# Patient Record
Sex: MALE | Race: BLACK OR AFRICAN AMERICAN | Employment: UNEMPLOYED | URBAN - METROPOLITAN AREA
[De-identification: names, ages, dates, MRNs, and addresses within clinical notes are randomized per-mention and may not be internally consistent; named-entity substitution may affect disease eponyms.]

---

## 2018-11-30 ENCOUNTER — HOSPITAL ENCOUNTER (OUTPATIENT)
Age: 58
Setting detail: OBSERVATION
Discharge: HOME | End: 2018-12-02
Attending: EMERGENCY MEDICINE | Admitting: INTERNAL MEDICINE
Payer: MEDICARE

## 2018-11-30 DIAGNOSIS — K92.1 MELENA: Primary | ICD-10-CM

## 2018-11-30 DIAGNOSIS — K92.2 LOWER GI BLEED: ICD-10-CM

## 2018-11-30 LAB
ALBUMIN SERPL-MCNC: 3.9 G/DL (ref 3.5–5)
ALBUMIN/GLOB SERPL: 1 {RATIO} (ref 1.2–3.5)
ALP SERPL-CCNC: 151 U/L (ref 50–136)
ALT SERPL-CCNC: 42 U/L (ref 12–65)
ANION GAP SERPL CALC-SCNC: 6 MMOL/L (ref 7–16)
AST SERPL-CCNC: 32 U/L (ref 15–37)
BASOPHILS # BLD: 0.1 K/UL (ref 0–0.2)
BASOPHILS NFR BLD: 1 % (ref 0–2)
BILIRUB SERPL-MCNC: 0.3 MG/DL (ref 0.2–1.1)
BUN SERPL-MCNC: 22 MG/DL (ref 6–23)
CALCIUM SERPL-MCNC: 9 MG/DL (ref 8.3–10.4)
CHLORIDE SERPL-SCNC: 106 MMOL/L (ref 98–107)
CO2 SERPL-SCNC: 27 MMOL/L (ref 21–32)
CREAT SERPL-MCNC: 1.45 MG/DL (ref 0.8–1.5)
DIFFERENTIAL METHOD BLD: ABNORMAL
EOSINOPHIL # BLD: 0.3 K/UL (ref 0–0.8)
EOSINOPHIL NFR BLD: 4 % (ref 0.5–7.8)
ERYTHROCYTE [DISTWIDTH] IN BLOOD BY AUTOMATED COUNT: 13.2 % (ref 11.9–14.6)
GLOBULIN SER CALC-MCNC: 4 G/DL (ref 2.3–3.5)
GLUCOSE SERPL-MCNC: 137 MG/DL (ref 65–100)
HCT VFR BLD AUTO: 39.9 % (ref 41.1–50.3)
HGB BLD-MCNC: 13.2 G/DL (ref 13.6–17.2)
IMM GRANULOCYTES # BLD: 0 K/UL (ref 0–0.5)
IMM GRANULOCYTES NFR BLD AUTO: 0 % (ref 0–5)
LYMPHOCYTES # BLD: 3.2 K/UL (ref 0.5–4.6)
LYMPHOCYTES NFR BLD: 43 % (ref 13–44)
MCH RBC QN AUTO: 31.4 PG (ref 26.1–32.9)
MCHC RBC AUTO-ENTMCNC: 33.1 G/DL (ref 31.4–35)
MCV RBC AUTO: 95 FL (ref 79.6–97.8)
MONOCYTES # BLD: 0.6 K/UL (ref 0.1–1.3)
MONOCYTES NFR BLD: 8 % (ref 4–12)
NEUTS SEG # BLD: 3.4 K/UL (ref 1.7–8.2)
NEUTS SEG NFR BLD: 45 % (ref 43–78)
NRBC # BLD: 0 K/UL (ref 0–0.2)
PLATELET # BLD AUTO: 197 K/UL (ref 150–450)
PMV BLD AUTO: 11 FL (ref 9.4–12.3)
POTASSIUM SERPL-SCNC: 4.2 MMOL/L (ref 3.5–5.1)
PROT SERPL-MCNC: 7.9 G/DL (ref 6.3–8.2)
RBC # BLD AUTO: 4.2 M/UL (ref 4.23–5.6)
SODIUM SERPL-SCNC: 139 MMOL/L (ref 136–145)
WBC # BLD AUTO: 7.5 K/UL (ref 4.3–11.1)

## 2018-11-30 PROCEDURE — 85025 COMPLETE CBC W/AUTO DIFF WBC: CPT

## 2018-11-30 PROCEDURE — 99284 EMERGENCY DEPT VISIT MOD MDM: CPT | Performed by: EMERGENCY MEDICINE

## 2018-11-30 PROCEDURE — 80053 COMPREHEN METABOLIC PANEL: CPT

## 2018-11-30 PROCEDURE — 81003 URINALYSIS AUTO W/O SCOPE: CPT | Performed by: EMERGENCY MEDICINE

## 2018-11-30 NOTE — ED TRIAGE NOTES
Pt reports past 3 day days he has been having blood in his stool that have progressed from bright red to almost black stools. Pt also reports lower abd pain, pt is on xarelto as well

## 2018-12-01 PROBLEM — K62.5 RECTAL BLEED: Status: ACTIVE | Noted: 2018-12-01

## 2018-12-01 LAB — GLUCOSE BLD STRIP.AUTO-MCNC: 96 MG/DL (ref 65–100)

## 2018-12-01 PROCEDURE — 74011250637 HC RX REV CODE- 250/637: Performed by: INTERNAL MEDICINE

## 2018-12-01 PROCEDURE — 99218 HC RM OBSERVATION: CPT

## 2018-12-01 PROCEDURE — 96360 HYDRATION IV INFUSION INIT: CPT

## 2018-12-01 PROCEDURE — 74011250636 HC RX REV CODE- 250/636: Performed by: INTERNAL MEDICINE

## 2018-12-01 PROCEDURE — 82962 GLUCOSE BLOOD TEST: CPT

## 2018-12-01 PROCEDURE — 96361 HYDRATE IV INFUSION ADD-ON: CPT

## 2018-12-01 RX ORDER — METOPROLOL TARTRATE 25 MG/1
12.5 TABLET, FILM COATED ORAL 2 TIMES DAILY
Status: DISCONTINUED | OUTPATIENT
Start: 2018-12-01 | End: 2018-12-02 | Stop reason: HOSPADM

## 2018-12-01 RX ORDER — SODIUM CHLORIDE 0.9 % (FLUSH) 0.9 %
5-10 SYRINGE (ML) INJECTION EVERY 8 HOURS
Status: DISCONTINUED | OUTPATIENT
Start: 2018-12-01 | End: 2018-12-02 | Stop reason: HOSPADM

## 2018-12-01 RX ORDER — LISINOPRIL 5 MG/1
10 TABLET ORAL 2 TIMES DAILY
Status: DISCONTINUED | OUTPATIENT
Start: 2018-12-01 | End: 2018-12-02 | Stop reason: HOSPADM

## 2018-12-01 RX ORDER — SODIUM CHLORIDE 9 MG/ML
100 INJECTION, SOLUTION INTRAVENOUS CONTINUOUS
Status: DISCONTINUED | OUTPATIENT
Start: 2018-12-01 | End: 2018-12-02 | Stop reason: HOSPADM

## 2018-12-01 RX ORDER — ACETAMINOPHEN 325 MG/1
650 TABLET ORAL
Status: DISCONTINUED | OUTPATIENT
Start: 2018-12-01 | End: 2018-12-02 | Stop reason: HOSPADM

## 2018-12-01 RX ORDER — ONDANSETRON 2 MG/ML
4 INJECTION INTRAMUSCULAR; INTRAVENOUS
Status: DISCONTINUED | OUTPATIENT
Start: 2018-12-01 | End: 2018-12-02 | Stop reason: HOSPADM

## 2018-12-01 RX ORDER — SODIUM CHLORIDE 0.9 % (FLUSH) 0.9 %
5-10 SYRINGE (ML) INJECTION AS NEEDED
Status: DISCONTINUED | OUTPATIENT
Start: 2018-12-01 | End: 2018-12-02 | Stop reason: HOSPADM

## 2018-12-01 RX ORDER — HYDROCORTISONE 25 MG/G
CREAM TOPICAL 4 TIMES DAILY
Status: DISCONTINUED | OUTPATIENT
Start: 2018-12-01 | End: 2018-12-02

## 2018-12-01 RX ORDER — OXYCODONE AND ACETAMINOPHEN 10; 325 MG/1; MG/1
1 TABLET ORAL
Status: DISCONTINUED | OUTPATIENT
Start: 2018-12-01 | End: 2018-12-02 | Stop reason: HOSPADM

## 2018-12-01 RX ADMIN — Medication 10 ML: at 05:33

## 2018-12-01 RX ADMIN — LISINOPRIL 10 MG: 5 TABLET ORAL at 09:37

## 2018-12-01 RX ADMIN — Medication 10 ML: at 15:28

## 2018-12-01 RX ADMIN — Medication 10 ML: at 21:58

## 2018-12-01 RX ADMIN — SODIUM CHLORIDE 100 ML/HR: 900 INJECTION, SOLUTION INTRAVENOUS at 03:00

## 2018-12-01 RX ADMIN — LISINOPRIL 10 MG: 5 TABLET ORAL at 17:51

## 2018-12-01 RX ADMIN — SODIUM CHLORIDE 100 ML/HR: 900 INJECTION, SOLUTION INTRAVENOUS at 17:53

## 2018-12-01 RX ADMIN — METOPROLOL TARTRATE 12.5 MG: 25 TABLET, FILM COATED ORAL at 09:37

## 2018-12-01 RX ADMIN — METOPROLOL TARTRATE 12.5 MG: 25 TABLET, FILM COATED ORAL at 18:00

## 2018-12-01 NOTE — PROGRESS NOTES
Hospitalist Progress Note   
2018 Admit Date: 2018 11:30 PM  
NAME: Danish Garcia III  
:  1960 MRN:  [id number] Attending: Laura Lee MD 
PCP:  Kyrie Green, Not On File SUBJECTIVE:  
Mr. Ramona John is a 64 yo male with a past medical history of colon cancer (, underwent surgery and subsequently had 6 months of chemotherapy, which he 
completed in 2013), history of DVT on chronic AC with Xarelto, essential hypertension, gout.  
  
Pt presented to the ER with symptoms BRBPR and dark stool for the past 2 days. Reports the amount of blood was significant. Also reports some vague periumbilical abd pain. No fever, chills, CP, N/V or urinary symptoms.  
  
In the ER he was afebrile, hemodynamically stable and saturating well on RA. FOBT +. Hb was 13. Interval History (): patient examined at bedside. No acute overnight events. Patient has not had a bowel movement since admission and states that he has not observed any additional bleeding. No fevers/chills, abdominal pain, chest pain, shortness of breath, nausea/vomiting, or BRBPR/melena. Review of Systems negative with exception of pertinent positives noted above PHYSICAL EXAM  
 
Visit Vitals /72 Pulse 74 Temp 98.2 °F (36.8 °C) Resp 16 Ht 5' 10\" (1.778 m) Wt 118.8 kg (262 lb) SpO2 99% BMI 37.59 kg/m² Temp (24hrs), Av.8 °F (36.6 °C), Min:97.4 °F (36.3 °C), Max:98.2 °F (36.8 °C) Oxygen Therapy O2 Sat (%): 99 % (18 1440) Pulse via Oximetry: 84 beats per minute (18 0205) O2 Device: Room air (18 0123) No intake or output data in the 24 hours ending 18 1609 General: No acute distress   
Lungs:  CTA Bilaterally. Heart:  Regular rate and rhythm,  No murmur, rub, or gallop Abdomen: Soft, Non distended, Non tender, Positive bowel sounds Extremities: No cyanosis, clubbing or edema Neurologic:  No focal deficits ASSESSMENT Active Hospital Problems Diagnosis Date Noted  Rectal bleed 12/01/2018  DVT of upper extremity (deep vein thrombosis) ([facility] [address] 07/09/2013  
  left  Colon cancer ([facility] [address] 07/09/2013 S/p surgery and chemo Plan: # Acute GI bleed, likely due to hemorrhoids rather than UGIB 
- GI consultation, appreciate recs 
- no immediate plans for endoscopy at this time, will start clear liquid diet and make NPO after midnight 
- added hydrocortisone rectal cream  
- trend Hgb/Hct 
- transfuse if Hgb<7 
- holding Xarelto for now, will restart with symptomatic improvement # Chronic DVT of UE 
- holding Xarelto for now, as above # History of CRC 
- follows at the [address] for screening F/E/N: maintenance fluids, replete electrolytes as needed, clear liquid diet and NPO after MN Ppx: SCDs for VTE Code Status: FULL CODE Disposition: pending workup as above, patient will be discharged home following hospitalization with no immediate post-discharge needs Signed By: Pérez Blake DO   
 December 1, 2018

## 2018-12-01 NOTE — ED NOTES
TRANSFER - OUT REPORT: 
 
Verbal report given to SHANT Vega(name) on Dede Mcgrath III  being transferred to 2(unit) for routine progression of care Report consisted of patients Situation, Background, Assessment and  
Recommendations(SBAR). Information from the following report(s) ED Summary and Recent Results was reviewed with the receiving nurse. Lines:  
Peripheral IV 12/01/18 Anterior;Right Hand (Active) Opportunity for questions and clarification was provided. Patient transported with: 
 [employer]

## 2018-12-01 NOTE — PROGRESS NOTES
TRANSFER - IN REPORT: 
 
Verbal report received from Clara RN(name) on Brooksie Sheerer III  being received from ER(unit) for routine progression of care Report consisted of patients Situation, Background, Assessment and  
Recommendations(SBAR). Information from the following report(s) SBAR and ED Summary was reviewed with the receiving nurse. Opportunity for questions and clarification was provided. Assessment completed upon patients arrival to unit and care assumed.

## 2018-12-01 NOTE — PROGRESS NOTES
Hourly rounds performed this shift, pt sitting in bed watching TV. No bloody stools noted this shift. Pt refused Hemorrhoid cream. No other needs at this time, pt instructed on NPO status after MN.

## 2018-12-01 NOTE — ED PROVIDER NOTES
Patient is a 81-year-old male Who is coming in with melena. He states that he's had this since yesterday and has had about 4 episodes of dark stools. There was originally some blood mixed in. He does take xarelto for blood clots in the past.  He states that he does have a little bit of abdominal pain associated with it. He denies any vomiting. No syncope or other associated symptoms. Past Medical History:  
Diagnosis Date  Cancer [facility])   
 colon cancer  Gastrointestinal disorder GERD  Hypertension  Ill-defined condition   
 blood clot neck Past Surgical History:  
Procedure Laterality Date  ABDOMEN SURGERY PROC UNLISTED    
 colon ca  HX ORTHOPAEDIC    
 back] No family history on file. Social History Socioeconomic History  Marital status: LEGALLY  Spouse name: Not on file  Number of children: Not on file  Years of education: Not on file  Highest education level: Not on file Social Needs  Financial resource strain: Not on file  Food insecurity - worry: Not on file  Food insecurity - inability: Not on file  Transportation needs - medical: Not on file  Transportation needs - non-medical: Not on file Occupational History  Not on file Tobacco Use  Smoking status: Never Smoker Substance and Sexual Activity  Alcohol use: No  
 Drug use: No  
 Sexual activity: Not on file Other Topics Concern  Not on file Social History Narrative  Not on file ALLERGIES: Patient has no known allergies. Review of Systems Constitutional: Negative for chills and fever. Respiratory: Negative for chest tightness, shortness of breath, wheezing and stridor. Cardiovascular: Negative. Negative for chest pain and palpitations. Gastrointestinal: Negative for abdominal pain, diarrhea, nausea and vomiting. Musculoskeletal: Negative for neck pain and neck stiffness. Skin: Negative. All other systems reviewed and are negative. Vitals:  
 11/30/18 1901 BP: 149/71 Pulse: 97 Resp: 18 Temp: 98.1 °F (36.7 °C) SpO2: 97% Weight: 118.8 kg (262 lb) Height: 5' 10\" (1.778 m) Physical Exam  
Constitutional: He appears well-developed and well-nourished. No distress. HENT:  
Head: Normocephalic and atraumatic. Mouth/Throat: No oropharyngeal exudate. Eyes: Conjunctivae and EOM are normal. Pupils are equal, round, and reactive to light. No scleral icterus. Neck: Normal range of motion. Neck supple. No tracheal deviation present. No thyromegaly present. Pulmonary/Chest: Effort normal and breath sounds normal. No stridor. No respiratory distress. He has no wheezes. He has no rales. He exhibits no tenderness. Abdominal: Soft. He exhibits no distension and no mass. There is no tenderness. There is no guarding. Genitourinary: Rectal exam shows guaiac positive stool. Neurological: He is alert. Skin: Skin is warm and dry. He is not diaphoretic. Psychiatric: He has a normal mood and affect. His behavior is normal.  
Nursing note and vitals reviewed. MDM Number of Diagnoses or Management Options Lower GI bleed:  
Melena:  
Diagnosis management comments: Patient has a stable hemoglobin currently. He does have Hemoccult positive stools and is on xarelto. Given this I have spoken to the hospitalist for evaluation. Alexsandra Morales MD; 11/30/2018 @11:48 PM Voice dictation software was used during the making of this note. This software is not perfect and grammatical and other typographical errors may be present. This note has not been proofread for errors. 
=================================================================== Amount and/or Complexity of Data Reviewed Clinical lab tests: ordered and reviewed (Results for orders placed or performed during the hospital encounter of 11/30/18 
-CBC WITH AUTOMATED DIFF 
 Result                      Value             Ref Range WBC                         7.5               4.3 - 11.1 K* 
     RBC                         4.20 (L)          4.23 - 5.6 M* HGB                         13.2 (L)          13.6 - 17.2 * HCT                         39.9 (L)          41.1 - 50.3 % MCV                         95.0              79.6 - 97.8 * MCH                         31.4              26.1 - 32.9 * MCHC                        33.1              31.4 - 35.0 * RDW                         13.2              11.9 - 14.6 % PLATELET                    197               150 - 450 K/* MPV                         11.0              9.4 - 12.3 FL ABSOLUTE NRBC               0.00              0.0 - 0.2 K/* DF                          AUTOMATED NEUTROPHILS                 45                43 - 78 % LYMPHOCYTES                 43                13 - 44 % MONOCYTES                   8                 4.0 - 12.0 % EOSINOPHILS                 4                 0.5 - 7.8 % BASOPHILS                   1                 0.0 - 2.0 % IMMATURE GRANULOCYTES       0                 0.0 - 5.0 %   
     ABS. NEUTROPHILS            3.4               1.7 - 8.2 K/* ABS. LYMPHOCYTES            3.2               0.5 - 4.6 K/* ABS. MONOCYTES              0.6               0.1 - 1.3 K/* ABS. EOSINOPHILS            0.3               0.0 - 0.8 K/* ABS. BASOPHILS              0.1               0.0 - 0.2 K/* ABS. IMM. GRANS.            0.0               0.0 - 0.5 K/* 
-METABOLIC PANEL, COMPREHENSIVE Result                      Value             Ref Range Sodium                      139               136 - 145 mm* Potassium                   4.2               3.5 - 5.1 mm*      Chloride                    106               98 - 107 mmo* 
 CO2                         27                21 - 32 mmol* Anion gap                   6 (L)             7 - 16 mmol/L Glucose                     137 (H)           65 - 100 mg/* BUN                         22                6 - 23 MG/DL Creatinine                  1.45              0.8 - 1.5 MG* 
     GFR est AA                  >60               >60 ml/min/1* GFR est non-AA              53 (L)            >60 ml/min/1* Calcium                     9.0               8.3 - 10.4 M* Bilirubin, total            0.3               0.2 - 1.1 MG* ALT (SGPT)                  42                12 - 65 U/L   
     AST (SGOT)                  32                15 - 37 U/L Alk. phosphatase            151 (H)           50 - 136 U/L Protein, total              7.9               6.3 - 8.2 g/* Albumin                     3.9               3.5 - 5.0 g/* Globulin                    4.0 (H)           2.3 - 3.5 g/* A-G Ratio                   1.0 (L)           1.2 - 3.5     ) Procedures

## 2018-12-01 NOTE — H&P
Hospitalist H&P Note Admit Date:  2018 11:30 PM  
Name:  Paul Mcknight Age:  63 y.o. 
:  1960 MRN:  [id number] PCP:  Sandra, Not On File Treatment Team: Attending Provider: Edilia Mckeon MD; Primary Nurse: Bhaskar Correa 
 
HPI:  
Mr. Inessa Camp is a 64 yo male with a past medical history of colon cancer (, underwent surgery and subsequently had 6 months of chemotherapy, which he 
completed in 2013), history of DVT on chronic AC with Xarelto, essential hypertension, gout. Pt presented to the ER with symptoms BRBPR and dark stool for the past 2 days. Reports the amount of blood was significant. Also reports some vague periumbilical abd pain. No fever, chills, CP, N/V or urinary symptoms. In the ER he was afebrile, hemodynamically stable and saturating well on RA. FOBT +. Hb was 13.  
 
10 systems reviewed and negative except as noted in HPI. Past Medical History:  
Diagnosis Date  Cancer [facility])   
 colon cancer  Gastrointestinal disorder GERD  Hypertension  Ill-defined condition   
 blood clot neck Past Surgical History:  
Procedure Laterality Date  ABDOMEN SURGERY PROC UNLISTED    
 colon ca  HX ORTHOPAEDIC    
 back] No Known Allergies Social History Tobacco Use  Smoking status: Never Smoker Substance Use Topics  Alcohol use: No  
  
No family history on file. There is no immunization history on file for this patient. PTA Medications: 
Prior to Admission Medications Prescriptions Last Dose Informant Patient Reported? Taking?  
diazepam (VALIUM) 5 mg tablet   No No  
Sig: Take 1 Tab by mouth every eight (8) hours as needed (Muscle spasm). Max Daily Amount: 15 mg.  
docusate calcium (SURFAK) 240 mg capsule   Yes No  
Sig: Take 240 mg by mouth two (2) times a day. lisinopril (PRINIVIL, ZESTRIL) 10 mg tablet   No No  
Sig: Take 1 Tab by mouth two (2) times a day.   
metoprolol (LOPRESSOR) 25 mg tablet   No No  
 Sig: Take 0.5 Tabs by mouth two (2) times a day. omeprazole (PRILOSEC) 20 mg capsule   Yes No  
Sig: Take 20 mg by mouth daily. oxyCODONE-acetaminophen (PERCOCET 10)  mg per tablet   No No  
Sig: Take 1 Tab by mouth every six (6) hours as needed. oxyCODONE-acetaminophen (PERCOCET) 5-325 mg per tablet   No No  
Sig: Take 1 Tab by mouth every four (4) hours as needed for Pain. Max Daily Amount: 6 Tabs. predniSONE (STERAPRED DS) 10 mg dose pack   No No  
Sig: Please take as directed on package. Please clarify any questions with the Pharmacist.  
rivaroxaban (XARELTO) 10 mg tablet   Yes Yes Sig: Take 10 mg by mouth daily. tramadol (ULTRAM) 50 mg tablet   No No  
Sig: Take 1 tablet by mouth every six (6) hours as needed for Pain. Facility-Administered Medications: None Objective:  
 
Patient Vitals for the past 24 hrs: 
 Temp Pulse Resp BP SpO2  
12/01/18 0205  84  133/76 99 % 11/30/18 1901 98.1 °F (36.7 °C) 97 18 149/71 97 % Oxygen Therapy O2 Sat (%): 99 % (12/01/18 0205) Pulse via Oximetry: 84 beats per minute (12/01/18 0205) O2 Device: Room air (11/30/18 1901) No intake or output data in the 24 hours ending 12/01/18 0207 Physical Exam: 
General:    Well nourished. Alert. Eyes:   Normal sclera. Extraocular movements intact. ENT:  Normocephalic, atraumatic. Moist mucous membranes CV:   RRR. No murmur, rub, or gallop. Lungs:  CTAB. No wheezing, rhonchi, or rales. Abdomen: Soft, nontender, nondistended. Bowel sounds normal.  
Extremities: Warm and dry. No cyanosis or edema. Neurologic: CN II-XII grossly intact. Sensation intact. Skin:     No rashes or jaundice. Psych:  Normal mood and affect. I reviewed the labs, imaging, EKGs, telemetry, and other studies done this admission. Data Review:  
Recent Results (from the past 24 hour(s)) CBC WITH AUTOMATED DIFF Collection Time: 11/30/18  7:10 PM  
Result Value Ref Range WBC 7.5 4.3 - 11.1 K/uL RBC 4.20 (L) 4.23 - 5.6 M/uL  
 HGB 13.2 (L) 13.6 - 17.2 g/dL HCT 39.9 (L) 41.1 - 50.3 % MCV 95.0 79.6 - 97.8 FL  
 MCH 31.4 26.1 - 32.9 PG  
 MCHC 33.1 31.4 - 35.0 g/dL  
 RDW 13.2 11.9 - 14.6 % PLATELET 180 176 - 189 K/uL MPV 11.0 9.4 - 12.3 FL ABSOLUTE NRBC 0.00 0.0 - 0.2 K/uL  
 DF AUTOMATED NEUTROPHILS 45 43 - 78 % LYMPHOCYTES 43 13 - 44 % MONOCYTES 8 4.0 - 12.0 % EOSINOPHILS 4 0.5 - 7.8 % BASOPHILS 1 0.0 - 2.0 % IMMATURE GRANULOCYTES 0 0.0 - 5.0 %  
 ABS. NEUTROPHILS 3.4 1.7 - 8.2 K/UL  
 ABS. LYMPHOCYTES 3.2 0.5 - 4.6 K/UL  
 ABS. MONOCYTES 0.6 0.1 - 1.3 K/UL  
 ABS. EOSINOPHILS 0.3 0.0 - 0.8 K/UL  
 ABS. BASOPHILS 0.1 0.0 - 0.2 K/UL  
 ABS. IMM. GRANS. 0.0 0.0 - 0.5 K/UL METABOLIC PANEL, COMPREHENSIVE Collection Time: 11/30/18  7:10 PM  
Result Value Ref Range Sodium 139 136 - 145 mmol/L Potassium 4.2 3.5 - 5.1 mmol/L Chloride 106 98 - 107 mmol/L  
 CO2 27 21 - 32 mmol/L Anion gap 6 (L) 7 - 16 mmol/L Glucose 137 (H) 65 - 100 mg/dL BUN 22 6 - 23 MG/DL Creatinine 1.45 0.8 - 1.5 MG/DL  
 GFR est AA >60 >60 ml/min/1.73m2 GFR est non-AA 53 (L) >60 ml/min/1.73m2 Calcium 9.0 8.3 - 10.4 MG/DL Bilirubin, total 0.3 0.2 - 1.1 MG/DL  
 ALT (SGPT) 42 12 - 65 U/L  
 AST (SGOT) 32 15 - 37 U/L Alk. phosphatase 151 (H) 50 - 136 U/L Protein, total 7.9 6.3 - 8.2 g/dL Albumin 3.9 3.5 - 5.0 g/dL Globulin 4.0 (H) 2.3 - 3.5 g/dL A-G Ratio 1.0 (L) 1.2 - 3.5 All Micro Results None Other Studies: No results found. Assessment and Plan:  
 
Hospital Problems as of 12/1/2018 Never Reviewed Codes Class Noted - Resolved POA Rectal bleed ICD-10-CM: K62.5 ICD-9-CM: 569.3  12/1/2018 - Present Unknown BRBPR, melena: for 2 days. Pt is on Xarelto and has history of colon cancer s/p surgery and chemo in 2012. Hb on admission 13.2. Will monitor closely and consult GI service. Hx of UE extensive DVT: Had a port in place back then, for which he underwent tpa for in 2013. Will hold Xarelto for now. Essential hypertension: Cont home Lisinopril and Lopressor. Monitor BP levels and adjust medications as needed. Gout: Reports being at home on Allopurinol but cannot recall the dose. Family to bring his medications from home in am.  
 
 
DVT ppx:  SCDs Code status:  Full Estimated LOS:  Greater than 2 midnights Risk:  high Signed:  
Jono Richard MD

## 2018-12-01 NOTE — CONSULTS
[facility] Consult Note       [facility] Phys[facility]: [facility]    Referring Provider:  Raegan Shin Date:  12/1/2018    Admit Date:  11/30/2018    Chief Complaint:  Rectal bleeding    Subjective:     History of Present Illness:  Patient is a 63 y.o. male with PMH of below, who is seen in consultation at the request of Dr. Frank Dergoot for rectal bleeding. His history is notable for CRC in about 2012 with surgery and chemotherapy. He has had yearly colonoscopy through the [place] since that time, last in 2017. He does not think he has had a polyp since his CRC. He has a history of minor rectal bleeding with BMs intermittently over the last few years. He does not know the cause. In the last few days, he has had increased amounts of red blood with clots and dark stool. He has also had a \"soreness\" throughout his entire abdomen which is new for him. He has no upper GI symptoms. He has no dizziness, CP, or SOB. His care is complicated by Xarelto for prior clot. PMH:  Past Medical History:   Diagnosis Date    Cancer [facility])     colon cancer    Gastrointestinal disorder     GERD    Hypertension     Ill-defined condition     blood clot neck       PSH:  Past Surgical History:   Procedure Laterality Date    ABDOMEN SURGERY PROC UNLISTED      colon ca    HX ORTHOPAEDIC      back]       Allergies:  No Known Allergies    Home Medications:  Prior to Admission medications    Medication Sig Start Date End Date Taking? Authorizing Provider   rivaroxaban (XARELTO) 10 mg tablet Take 10 mg by mouth daily. Yes Other, MD Mague   predniSONE (STERAPRED DS) 10 mg dose pack Please take as directed on package. Please clarify any questions with the Pharmacist. 1/23/16   Sherlyn Olivo MD   diazepam (VALIUM) 5 mg tablet Take 1 Tab by mouth every eight (8) hours as needed (Muscle spasm).  Max Daily Amount: 15 mg. 1/23/16   Sherlyn Olivo MD   oxyCODONE-acetaminophen (PERCOCET) 5-325 mg per tablet Take 1 Tab by mouth every four (4) hours as needed for Pain. Max Daily Amount: 6 Tabs. 1/23/16   So Randolph MD   tramadol (ULTRAM) 50 mg tablet Take 1 tablet by mouth every six (6) hours as needed for Pain. 8/25/14   Haris Brewer MD   oxyCODONE-acetaminophen (PERCOCET 10)  mg per tablet Take 1 Tab by mouth every six (6) hours as needed. 10/21/13   Anusha Gallegos MD   docusate calcium (SURFAK) 240 mg capsule Take 240 mg by mouth two (2) times a day. Mague Nolasco MD   lisinopril (PRINIVIL, ZESTRIL) 10 mg tablet Take 1 Tab by mouth two (2) times a day. 7/11/13   Scarlet Farris MD   metoprolol (LOPRESSOR) 25 mg tablet Take 0.5 Tabs by mouth two (2) times a day. 7/11/13   Scarlet Farris MD   omeprazole (PRILOSEC) 20 mg capsule Take 20 mg by mouth daily. Mague Nolasco MD       Hospital Medications:  Current Facility-Administered Medications   Medication Dose Route Frequency    lisinopril (PRINIVIL, ZESTRIL) tablet 10 mg  10 mg Oral BID    metoprolol tartrate (LOPRESSOR) tablet 12.5 mg  12.5 mg Oral BID    sodium chloride (NS) flush 5-10 mL  5-10 mL IntraVENous Q8H    sodium chloride (NS) flush 5-10 mL  5-10 mL IntraVENous PRN    0.9% sodium chloride infusion  100 mL/hr IntraVENous CONTINUOUS    acetaminophen (TYLENOL) tablet 650 mg  650 mg Oral Q4H PRN    oxyCODONE-acetaminophen (PERCOCET 10)  mg per tablet 1 Tab  1 Tab Oral Q4H PRN    ondansetron (ZOFRAN) injection 4 mg  4 mg IntraVENous Q4H PRN       Social History:  Social History     Tobacco Use    Smoking status: Never Smoker   Substance Use Topics    Alcohol use: No       Pt denies any history of drug use, blood transfusions, or tattoos. Family History:  No family history on file. Review of Systems:  A detailed 10 system ROS is obtained, with pertinent positives as listed above. All others are negative.     Diet:      Objective:     Physical Exam:  Vitals:  Visit Vitals  /78   Pulse 80   Temp 97.9 °F (36.6 °C)   Resp 17   Ht 5' 10\" (1.778 m)   Wt 118.8 kg (262 lb)   SpO2 100%   BMI 37.59 kg/m²     Gen:  Pt is alert, cooperative, no acute distress  Skin:  Extremities and face reveal no rashes. HEENT: Sclerae anicteric. Extra-occular muscles are intact. No oral ulcers. No abnormal pigmentation of the lips. The neck is supple. Cardiovascular: Regular rate and rhythm. No murmurs, gallops, or rubs. Respiratory:  Comfortable breathing with no accessory muscle use. Clear breath sounds anteriorly with no wheezes, rales, or rhonchi. GI:  Laparotomy scar. Abdomen nondistended, soft, and nontender. Normal active bowel sounds. No enlargement of the liver or spleen. No masses palpable. Rectal:  Deferred  Musculoskeletal:  No pitting edema of the lower legs. Neurological:  Gross memory appears intact. Patient is alert and oriented. Psychiatric:  Mood appears appropriate with judgement intact. Lymphatic:  No cervical or supraclavicular adenopathy. Laboratory:    Recent Labs     11/30/18  1910   WBC 7.5   HGB 13.2*   HCT 39.9*      MCV 95.0      K 4.2      CO2 27   BUN 22   CREA 1.45   CA 9.0   *   *   SGOT 32   ALT 42   TBILI 0.3   ALB 3.9   TP 7.9          Assessment:     Active Problems:    Rectal bleed (12/1/2018)  He has reported significant bleeding, but his hemoglobin is essentially normal after 2 days. I am not convinced there is any clinically significant GI bleeding. The lack of significant elevation of BUN also argues against any upper GI bleeding. I suspect hemorrhoidal bleeding. The new soreness could indicate ischemic colitis. I do not suspect a neoplasm considering his yearly colonoscopy. Plan:     I recommend conservative management. Will allow diet, provide Anusol suppository, observe for changes.     Lona Florentino MD

## 2018-12-01 NOTE — PROGRESS NOTES
12/01/18 0320 Dual Skin Pressure Injury Assessment Dual Skin Pressure Injury Assessment WDL Second Care Provider (Based on [address] [facility]) [address] Skin Integumentary Skin Integumentary (WDL) WDL Wound Prevention and Protection Methods Orientation of Wound Prevention Posterior Location of Wound Prevention Sacrum/Coccyx; Other (comment) Dressing Present  No  
Wound Offloading (Prevention Methods) Bed, pressure reduction mattress Musculoskeletal  
Musculoskeletal (WDL) WDL Primary Nurse Tessie Arredondo and SHANT Calabrese performed a dual skin assessment on this patient {[facility] SKIN ASSESMENT:2076 Jony score is 22

## 2018-12-01 NOTE — PROGRESS NOTES
Pt slept throughout the night with no complains. Hourly round completed throughout the shift. Bed in lower position and call light/ personal items within reach. Will continue to monitor and give bed side shift report to on coming day shift nurse.

## 2018-12-02 VITALS
RESPIRATION RATE: 18 BRPM | HEIGHT: 70 IN | DIASTOLIC BLOOD PRESSURE: 72 MMHG | OXYGEN SATURATION: 99 % | BODY MASS INDEX: 37.51 KG/M2 | SYSTOLIC BLOOD PRESSURE: 128 MMHG | HEART RATE: 68 BPM | WEIGHT: 262 LBS | TEMPERATURE: 98.2 F

## 2018-12-02 LAB
ANION GAP SERPL CALC-SCNC: 6 MMOL/L (ref 7–16)
BUN SERPL-MCNC: 12 MG/DL (ref 6–23)
CALCIUM SERPL-MCNC: 8.5 MG/DL (ref 8.3–10.4)
CHLORIDE SERPL-SCNC: 108 MMOL/L (ref 98–107)
CO2 SERPL-SCNC: 28 MMOL/L (ref 21–32)
CREAT SERPL-MCNC: 0.96 MG/DL (ref 0.8–1.5)
ERYTHROCYTE [DISTWIDTH] IN BLOOD BY AUTOMATED COUNT: 13 % (ref 11.9–14.6)
GLUCOSE SERPL-MCNC: 96 MG/DL (ref 65–100)
HCT VFR BLD AUTO: 39.9 % (ref 41.1–50.3)
HGB BLD-MCNC: 13.2 G/DL (ref 13.6–17.2)
MCH RBC QN AUTO: 31.1 PG (ref 26.1–32.9)
MCHC RBC AUTO-ENTMCNC: 33.1 G/DL (ref 31.4–35)
MCV RBC AUTO: 93.9 FL (ref 79.6–97.8)
NRBC # BLD: 0 K/UL (ref 0–0.2)
PLATELET # BLD AUTO: 184 K/UL (ref 150–450)
PMV BLD AUTO: 10.7 FL (ref 9.4–12.3)
POTASSIUM SERPL-SCNC: 4.1 MMOL/L (ref 3.5–5.1)
RBC # BLD AUTO: 4.25 M/UL (ref 4.23–5.6)
SODIUM SERPL-SCNC: 142 MMOL/L (ref 136–145)
WBC # BLD AUTO: 5.6 K/UL (ref 4.3–11.1)

## 2018-12-02 PROCEDURE — 74011250637 HC RX REV CODE- 250/637: Performed by: INTERNAL MEDICINE

## 2018-12-02 PROCEDURE — 74011250636 HC RX REV CODE- 250/636: Performed by: INTERNAL MEDICINE

## 2018-12-02 PROCEDURE — 99218 HC RM OBSERVATION: CPT

## 2018-12-02 PROCEDURE — 36415 COLL VENOUS BLD VENIPUNCTURE: CPT

## 2018-12-02 PROCEDURE — 85027 COMPLETE CBC AUTOMATED: CPT

## 2018-12-02 PROCEDURE — 80048 BASIC METABOLIC PNL TOTAL CA: CPT

## 2018-12-02 RX ORDER — HYDROCORTISONE ACETATE 25 MG/1
25 SUPPOSITORY RECTAL EVERY 12 HOURS
Status: DISCONTINUED | OUTPATIENT
Start: 2018-12-02 | End: 2018-12-02 | Stop reason: HOSPADM

## 2018-12-02 RX ORDER — HYDROCORTISONE ACETATE 25 MG/1
25 SUPPOSITORY RECTAL EVERY 12 HOURS
Qty: 60 SUPPOSITORY | Refills: 1 | Status: SHIPPED | OUTPATIENT
Start: 2018-12-02 | End: 2018-12-26

## 2018-12-02 RX ADMIN — SODIUM CHLORIDE 100 ML/HR: 900 INJECTION, SOLUTION INTRAVENOUS at 04:31

## 2018-12-02 RX ADMIN — HYDROCORTISONE ACETATE 25 MG: 25 SUPPOSITORY RECTAL at 10:06

## 2018-12-02 RX ADMIN — METOPROLOL TARTRATE 12.5 MG: 25 TABLET, FILM COATED ORAL at 10:01

## 2018-12-02 RX ADMIN — Medication 10 ML: at 05:02

## 2018-12-02 RX ADMIN — LISINOPRIL 10 MG: 5 TABLET ORAL at 10:01

## 2018-12-02 NOTE — DISCHARGE SUMMARY
Hospitalist Discharge Summary     Patient ID:  Magdaleno Edwards  [id number]  63 y.o.  1960  Admit date: 11/30/2018 11:30 PM  Discharge date and time: 12/2/2018  Attending: Vicky Pruett MD  PCP:  Terry Elam, Not On File  Treatment Team: Attending Provider: Vicky Pruett MD; Utilization Review: Pio Rosales RN    Principal Diagnosis <principal problem not specified>   Active Problems:    DVT of upper extremity (deep vein thrombosis) ([facility] [address] (7/9/2013)      Overview: left      Colon cancer ([facility] [address] (7/9/2013)      Overview: S/p surgery and chemo      Rectal bleed (12/1/2018)     Mr. Kevin Mcleod is a 62 yo male with a past medical history of colon cancer (2012, underwent surgery and subsequently had 6 months of chemotherapy, which he  completed in 05/2013), history of DVT on chronic AC with Xarelto, essential hypertension, gout.      Pt presented to the ER with symptoms BRBPR and dark stool for the past 2 days. Reports the amount of blood was significant. Also reports some vague periumbilical abd pain. No fever, chills, CP, N/V or urinary symptoms.      In the ER he was afebrile, hemodynamically stable and saturating well on RA. FOBT +. Hb was 13.        Interval History (12/2): patient examined at bedside. No acute events overnight, no more observed rectal bleeding (last episode was yesterday afternoon) or melena. Hgb stable and patient denies fevers/chills, shortness of breath, chest pain, abdominal pain, dizziness, fatigue, feeling like passing out. Hospital Course:  Please refer to the admission H&P for details of presentation. In summary, the patient is admitted for acute GI bleed. GI consulted with recommendations for hydrocortisone suppositories as bleeding is likely due to internal hemorrhoids rather than UGIB or other internal GI bleed. No planned inpatient interventions.  Patient with significant chronic history of blood clots, told patient that his Xarelto will need to be continued to prevent complications. Patient did not require any blood transfusions while hospitalized. Above explained to patient who understands and agrees with hospital discharge. Told to return to ED if he experiences any new or unexplained symptoms, or if he rectal bleeding worsens. Patient understands and agrees, all questions answered. [facility]2 [facility]. Significant Diagnostic Studies:       Labs: Results:       Chemistry Recent Labs     12/02/18 0543 11/30/18 1910   GLU 96 137*    139   K 4.1 4.2   * 106   CO2 28 27   BUN 12 22   CREA 0.96 1.45   CA 8.5 9.0   AGAP 6* 6*   AP  --  151*   TP  --  7.9   ALB  --  3.9   GLOB  --  4.0*   AGRAT  --  1.0*      CBC w/Diff Recent Labs     12/02/18 0543 11/30/18 1910   WBC 5.6 7.5   RBC 4.25 4.20*   HGB 13.2* 13.2*   HCT 39.9* 39.9*    197   GRANS  --  45   LYMPH  --  43   EOS  --  4      Cardiac Enzymes No results for input(s): CPK, CKND1, ANAMIKA in the last 72 hours. No lab exists for component: CKRMB, TROIP   Coagulation No results for input(s): PTP, INR, APTT in the last 72 hours. No lab exists for component: INREXT    Lipid Panel No results found for: CHOL, CHOLPOCT, CHOLX, CHLST, CHOLV, 653181, HDL, LDL, LDLC, DLDLP, 921675, VLDLC, VLDL, TGLX, TRIGL, TRIGP, TGLPOCT, CHHD, CHHDX   BNP No results for input(s): BNPP in the last 72 hours. Liver Enzymes Recent Labs     11/30/18 1910   TP 7.9   ALB 3.9   *   SGOT 32      Thyroid Studies No results found for: T4, T3U, TSH, TSHEXT         Discharge Exam:  Visit Vitals  /75 (BP 1 Location: Left arm, BP Patient Position: At rest)   Pulse 64   Temp 98 °F (36.7 °C)   Resp 17   Ht 5' 10\" (1.778 m)   Wt 118.8 kg (262 lb)   SpO2 97%   BMI 37.59 kg/m²     General:          No acute distress    Lungs:             CTA Bilaterally.    Heart:              Regular rate and rhythm,  No murmur, rub, or gallop  Abdomen:        Soft, Non distended, Non tender, Positive bowel sounds  Extremities:     No cyanosis, clubbing or edema  Neurologic:      No focal deficits    Disposition: home  Discharge Condition: stable  Patient Instructions:   Current Discharge Medication List      START taking these medications    Details   hydrocortisone (ANUSOL-HC) 25 mg supp Insert 1 Suppository into rectum every twelve (12) hours for 30 days. Qty: 60 Suppository, Refills: 1         CONTINUE these medications which have NOT CHANGED    Details   rivaroxaban (XARELTO) 10 mg tablet Take 10 mg by mouth daily. predniSONE (STERAPRED DS) 10 mg dose pack Please take as directed on package. Please clarify any questions with the Pharmacist.  Qty: 21 Tab, Refills: 0      diazepam (VALIUM) 5 mg tablet Take 1 Tab by mouth every eight (8) hours as needed (Muscle spasm). Max Daily Amount: 15 mg.  Qty: 20 Tab, Refills: 0      oxyCODONE-acetaminophen (PERCOCET) 5-325 mg per tablet Take 1 Tab by mouth every four (4) hours as needed for Pain. Max Daily Amount: 6 Tabs. Qty: 20 Tab, Refills: 0      tramadol (ULTRAM) 50 mg tablet Take 1 tablet by mouth every six (6) hours as needed for Pain. Qty: 20 tablet, Refills: 0      oxyCODONE-acetaminophen (PERCOCET 10)  mg per tablet Take 1 Tab by mouth every six (6) hours as needed. Qty: 20 Tab, Refills: 0      docusate calcium (SURFAK) 240 mg capsule Take 240 mg by mouth two (2) times a day. lisinopril (PRINIVIL, ZESTRIL) 10 mg tablet Take 1 Tab by mouth two (2) times a day. Qty: 60 Tab, Refills: 0      metoprolol (LOPRESSOR) 25 mg tablet Take 0.5 Tabs by mouth two (2) times a day. Qty: 1 Tab, Refills: 0      omeprazole (PRILOSEC) 20 mg capsule Take 20 mg by mouth daily. Activity: Activity as tolerated  Diet: Regular Diet  Wound Care: As directed    Follow-up  ·   [facility] within 1-2 weeks. Time spent to discharge patient 35 minutes  Signed:   Lily Beauchamp DO  12/2/2018  10:44 AM

## 2018-12-02 NOTE — PROGRESS NOTES
Hourly rounds done. Pt denies pain, nausea, vomiting. Pt stated 12/1 afternoon BM w/ bright red blood. No further BM during the night. NPO at midnight. All needs met at this time.

## 2018-12-02 NOTE — DISCHARGE INSTRUCTIONS
Gastrointestinal Bleeding: Care Instructions  Your Care Instructions    The digestive or gastrointestinal tract goes from the mouth to the anus. It is often called the GI tract. Bleeding can happen anywhere in the GI tract. It may be caused by an ulcer, an infection, or cancer. It may also be caused by medicines such as aspirin or ibuprofen. Light bleeding may not cause any symptoms at first. But if you continue to bleed for a while, you may feel very weak or tired. Sudden, heavy bleeding means you need to see a doctor right away. This kind of bleeding can be very dangerous. But it can usually be cured or controlled. The doctor may do some tests to find the cause of your bleeding. Follow-up care is a key part of your treatment and safety. Be sure to make and go to all appointments, and call your doctor if you are having problems. It's also a good idea to know your test results and keep a list of the medicines you take. How can you care for yourself at home? · Be safe with medicines. Take your medicines exactly as prescribed. Call your doctor if you think you are having a problem with your medicine. You will get more details on the specific medicines your doctor prescribes. · Do not take aspirin or other anti-inflammatory medicines, such as naproxen (Aleve) or ibuprofen (Advil, Motrin), without talking to your doctor first. Ask your doctor if it is okay to use acetaminophen (Tylenol). · Do not drink alcohol. · The bleeding may make you lose iron. So it's important to eat foods that have a lot of iron. These include red meat, shellfish, poultry, and eggs. They also include beans, raisins, whole-grain breads, and leafy green vegetables. If you want help planning meals, you can make an appointment with a dietitian. When should you call for help? Call 911 anytime you think you may need emergency care.  For example, call if:    · You have sudden, severe belly pain.     · You vomit blood or what looks like coffee grounds.     · You passed out (lost consciousness).     · Your stools are maroon or very bloody.    Call your doctor now or seek immediate medical care if:    · You are dizzy or lightheaded, or you feel like you may faint.     · Your stools are black and look like tar, or they have streaks of blood.     · You have belly pain.     · You vomit or have nausea.     · You have trouble swallowing, or it hurts when you swallow.    Watch closely for changes in your health, and be sure to contact your doctor if:    · You do not get better as expected. Where can you learn more? Go to http://israel-kathleen.info/. Enter X527 in the search box to learn more about \"Gastrointestinal Bleeding: Care Instructions. \"  Current as of: November 20, 2017  Content Version: 11.8  © 4907-1035 [employer]. Care instructions adapted under license by [employer] (which disclaims liability or warranty for this information). If you have questions about a medical condition or this instruction, always ask your healthcare professional. [place] [address] any warranty or liability for your use of this information. DISCHARGE SUMMARY from Nurse    PATIENT INSTRUCTIONS:    After general anesthesia or intravenous sedation, for 24 hours or while taking prescription Narcotics:  · Limit your activities  · Do not drive and operate hazardous machinery  · Do not make important personal or business decisions  · Do  not drink alcoholic beverages  · If you have not urinated within 8 hours after discharge, please contact your surgeon on call.     Report the following to your surgeon:  · Excessive pain, swelling, redness or odor of or around the surgical area  · Temperature over 100.5  · Nausea and vomiting lasting longer than 4 hours or if unable to take medications  · Any signs of decreased circulation or nerve impairment to extremity: change in color, persistent  numbness, tingling, coldness or increase pain  · Any questions      *  Please give a list of your current medications to your Primary Care Provider. *  Please update this list whenever your medications are discontinued, doses are      changed, or new medications (including over-the-counter products) are added. *  Please carry medication information at all times in case of emergency situations. These are general instructions for a healthy lifestyle:    No smoking/ No tobacco products/ Avoid exposure to second hand smoke  Surgeon General's Warning:  Quitting smoking now greatly reduces serious risk to your health. Obesity, smoking, and sedentary lifestyle greatly increases your risk for illness    A healthy diet, regular physical exercise & weight monitoring are important for maintaining a healthy lifestyle    You may be retaining fluid if you have a history of heart failure or if you experience any of the following symptoms:  Weight gain of 3 pounds or more overnight or 5 pounds in a week, increased swelling in our hands or feet or shortness of breath while lying flat in bed. Please call your doctor as soon as you notice any of these symptoms; do not wait until your next office visit. Recognize signs and symptoms of STROKE:    F-face looks uneven    A-arms unable to move or move unevenly    S-speech slurred or non-existent    T-time-call 911 as soon as signs and symptoms begin-DO NOT go       Back to bed or wait to see if you get better-TIME IS BRAIN. Warning Signs of HEART ATTACK     Call 911 if you have these symptoms:   Chest discomfort. Most heart attacks involve discomfort in the center of the chest that lasts more than a few minutes, or that goes away and comes back. It can feel like uncomfortable pressure, squeezing, fullness, or pain.  Discomfort in other areas of the upper body. Symptoms can include pain or discomfort in one or both arms, the back, neck, jaw, or stomach.    Shortness of breath with or without chest discomfort.  Other signs may include breaking out in a cold sweat, nausea, or lightheadedness. Don't wait more than five minutes to call 911 - MINUTES MATTER! Fast action can save your life. Calling 911 is almost always the fastest way to get lifesaving treatment. Emergency Medical Services staff can begin treatment when they arrive -- up to an hour sooner than if someone gets to the hospital by car. The discharge information has been reviewed with the patient. The patient verbalized understanding. Discharge medications reviewed with the patient and appropriate educational materials and side effects teaching were provided.   ___________________________________________________________________________________________________________________________________

## 2018-12-02 NOTE — PROGRESS NOTES
GI DAILY PROGRESS NOTE/SIgn Off Note Admit Date:  11/30/2018 Today's Date:  12/2/2018 CC:  GI bleeding Subjective:  
 
Patient reports minor BRBPR last night once. He otherwise feels well. Medications:  
Current Facility-Administered Medications Medication Dose Route Frequency  hydrocortisone (ANUSOL-HC) suppository 25 mg  25 mg Rectal Q12H  
 lisinopril (PRINIVIL, ZESTRIL) tablet 10 mg  10 mg Oral BID  metoprolol tartrate (LOPRESSOR) tablet 12.5 mg  12.5 mg Oral BID  sodium chloride (NS) flush 5-10 mL  5-10 mL IntraVENous Q8H  
 sodium chloride (NS) flush 5-10 mL  5-10 mL IntraVENous PRN  
 0.9% sodium chloride infusion  100 mL/hr IntraVENous CONTINUOUS  
 acetaminophen (TYLENOL) tablet 650 mg  650 mg Oral Q4H PRN  
 oxyCODONE-acetaminophen (PERCOCET 10)  mg per tablet 1 Tab  1 Tab Oral Q4H PRN  
 ondansetron (ZOFRAN) injection 4 mg  4 mg IntraVENous Q4H PRN Objective:  
Vitals: 
Visit Vitals /75 (BP 1 Location: Left arm, BP Patient Position: At rest) Pulse 64 Temp 98 °F (36.7 °C) Resp 17 Ht 5' 10\" (1.778 m) Wt 118.8 kg (262 lb) SpO2 97% BMI 37.59 kg/m² Intake/Output: 
No intake/output data recorded. 11/30 1901 - 12/02 0700 In: 2059 [P.O.:24; I.V.:2035] Out: - Exam: 
General appearance: alert, cooperative, no distress Neuro:  alert and oriented Data Review (Labs):   
Recent Labs 12/02/18 
0543 11/30/18 [address] WBC 5.6 7.5 HGB 13.2* 13.2* HCT 39.9* 39.9*  
 197 MCV 93.9 95.0  139  
K 4.1 4.2 * 106 CO2 28 27 BUN 12 22 CREA 0.96 1.45  
CA 8.5 9.0  
GLU 96 137* AP  --  151* SGOT  --  32 ALT  --  42  
TBILI  --  0.3 ALB  --  3.9 TP  --  7.9 Assessment:  
 
Active Problems: DVT of upper extremity (deep vein thrombosis) ([facility] (7/9/2013) Overview: left Colon cancer ([facility] (7/9/2013) Overview: S/p surgery and chemo Rectal bleed (12/1/2018)-hgb stable over 36 hours. Bleeding has been insignificant. In my opinion, this is most consistent with hemorrhoidal bleeding. Plan: In my opinion, the patient can be discharged today. I recommend sending him out on Anusol suppositories BID PRN for bleeding.   He should follow up with Omar Britt MD

## 2018-12-26 ENCOUNTER — APPOINTMENT (OUTPATIENT)
Dept: GENERAL RADIOLOGY | Age: 58
End: 2018-12-26
Attending: EMERGENCY MEDICINE
Payer: MEDICARE

## 2018-12-26 ENCOUNTER — HOSPITAL ENCOUNTER (EMERGENCY)
Age: 58
Discharge: HOME | End: 2018-12-26
Attending: EMERGENCY MEDICINE
Payer: MEDICARE

## 2018-12-26 VITALS
WEIGHT: 260 LBS | OXYGEN SATURATION: 97 % | DIASTOLIC BLOOD PRESSURE: 90 MMHG | RESPIRATION RATE: 16 BRPM | BODY MASS INDEX: 37.22 KG/M2 | TEMPERATURE: 97.9 F | HEART RATE: 89 BPM | SYSTOLIC BLOOD PRESSURE: 140 MMHG | HEIGHT: 70 IN

## 2018-12-26 DIAGNOSIS — M10.9 ACUTE GOUT OF LEFT ANKLE, UNSPECIFIED CAUSE: Primary | ICD-10-CM

## 2018-12-26 PROCEDURE — 73630 X-RAY EXAM OF FOOT: CPT

## 2018-12-26 PROCEDURE — 99283 EMERGENCY DEPT VISIT LOW MDM: CPT | Performed by: NURSE PRACTITIONER

## 2018-12-26 PROCEDURE — 74011250637 HC RX REV CODE- 250/637: Performed by: NURSE PRACTITIONER

## 2018-12-26 RX ORDER — HYDROCODONE BITARTRATE AND ACETAMINOPHEN 5; 325 MG/1; MG/1
1 TABLET ORAL ONCE
Status: COMPLETED | OUTPATIENT
Start: 2018-12-26 | End: 2018-12-26

## 2018-12-26 RX ORDER — ACETAMINOPHEN AND CODEINE PHOSPHATE 300; 30 MG/1; MG/1
1 TABLET ORAL
Qty: 24 TAB | Refills: 0 | Status: SHIPPED | OUTPATIENT
Start: 2018-12-26

## 2018-12-26 RX ORDER — PREDNISONE 20 MG/1
TABLET ORAL
Qty: 11 TAB | Refills: 0 | Status: SHIPPED | OUTPATIENT
Start: 2018-12-26

## 2018-12-26 RX ORDER — DEXAMETHASONE SODIUM PHOSPHATE 100 MG/10ML
10 INJECTION INTRAMUSCULAR; INTRAVENOUS
Status: COMPLETED | OUTPATIENT
Start: 2018-12-26 | End: 2018-12-26

## 2018-12-26 RX ADMIN — HYDROCODONE BITARTRATE AND ACETAMINOPHEN 1 TABLET: 5; 325 TABLET ORAL at 18:17

## 2018-12-26 RX ADMIN — DEXAMETHASONE SODIUM PHOSPHATE 10 MG: 10 INJECTION INTRAMUSCULAR; INTRAVENOUS at 18:17

## 2018-12-26 NOTE — ED PROVIDER NOTES
62 y/o m w hsx of htn, colon cancer, gout, gi bleed and gerd to ed with left foot pain onset about 4-5 days ago. No injury. No fever or chills. No other complaint. Past Medical History:   Diagnosis Date    Cancer [facility])     colon cancer    Gastrointestinal disorder     GERD    Hypertension     Ill-defined condition     blood clot neck       Past Surgical History:   Procedure Laterality Date    ABDOMEN SURGERY PROC UNLISTED      colon ca    HX ORTHOPAEDIC      back]         No family history on file. Social History     Socioeconomic History    Marital status: LEGALLY      Spouse name: Not on file    Number of children: Not on file    Years of education: Not on file    Highest education level: Not on file   Social Needs    Financial resource strain: Not on file    Food insecurity - worry: Not on file    Food insecurity - inability: Not on file    Transportation needs - medical: Not on file   [employer] needs - non-medical: Not on file   Occupational History    Not on file   Tobacco Use    Smoking status: Never Smoker   Substance and Sexual Activity    Alcohol use: No    Drug use: No    Sexual activity: Not on file   Other Topics Concern    Not on file   Social History Narrative    Not on file         ALLERGIES: Patient has no known allergies. Review of Systems   Constitutional: Negative for chills, fatigue and fever. HENT: Negative for ear pain and facial swelling. Eyes: Negative for discharge and redness. Respiratory: Negative for cough and shortness of breath. Cardiovascular: Negative for chest pain and palpitations. Gastrointestinal: Negative for nausea and vomiting. Musculoskeletal: Positive for gait problem, joint swelling and myalgias. Skin: Negative for color change and wound. Neurological: Negative for weakness and numbness. Psychiatric/Behavioral: Negative for confusion and decreased concentration.        Vitals:    12/26/18 1530   BP: 142/79   Pulse: (!) 108   Resp: 16   Temp: 97.9 °F (36.6 °C)   SpO2: 97%   Weight: 117.9 kg (260 lb)   Height: 5' 10\" (1.778 m)            Physical Exam   Constitutional: He is oriented to person, place, and time. He appears well-developed and well-nourished. HENT:   Head: Normocephalic and atraumatic. Eyes: EOM are normal. Pupils are equal, round, and reactive to light. Neck: Normal range of motion. Cardiovascular: Normal rate and regular rhythm. Pulmonary/Chest: Effort normal and breath sounds normal.   Musculoskeletal: He exhibits edema and tenderness. He exhibits no deformity. Left ankle: He exhibits decreased range of motion and swelling. He exhibits normal pulse. Feet:    Neurological: He is alert and oriented to person, place, and time. Skin: Skin is warm and dry. Psychiatric: He has a normal mood and affect. His behavior is normal. Judgment and thought content normal.   Nursing note and vitals reviewed. MDM  Number of Diagnoses or Management Options  Diagnosis management comments: Xray neg. Pt with hsx gout in past.  Presentation c/w gout. Will treat for gout.          Amount and/or Complexity of Data Reviewed  Tests in the radiology section of CPT®: ordered and reviewed    Risk of Complications, Morbidity, and/or Mortality  Presenting problems: minimal  Diagnostic procedures: minimal  Management options: minimal    Patient Progress  Patient progress: stable         Procedures

## 2018-12-26 NOTE — LETTER
[address] [facility] EMERGENCY DEPT [address] [address] 
[phone] Work/School Note Date: 12/26/2018 To Whom It May concern: Dede Mcgrath III was seen and treated today in the emergency room by the following provider(s): 
Attending Provider: Megha Gay MD 
Nurse Practitioner: Christopher Cortez NP. Edmundo Medico may return to work on Monday. Sincerely, Will Greer NP

## 2018-12-26 NOTE — DISCHARGE INSTRUCTIONS
Home with family   Take meds as directed   Follow with family doctor for continued care   Work note     Gout: Care Instructions  Your Care Instructions    Gout is a form of arthritis caused by a buildup of uric acid crystals in a joint. It causes sudden attacks of pain, swelling, redness, and stiffness, usually in one joint, especially the big toe. Gout usually comes on without a cause. But it can be brought on by drinking alcohol (especially beer) or eating seafood and red meat. Taking certain medicines, such as diuretics or aspirin, also can bring on an attack of gout. Taking your medicines as prescribed and following up with your doctor regularly can help you avoid gout attacks in the future. Follow-up care is a key part of your treatment and safety. Be sure to make and go to all appointments, and call your doctor if you are having problems. It's also a good idea to know your test results and keep a list of the medicines you take. How can you care for yourself at home? · If the joint is swollen, put ice or a cold pack on the area for 10 to 20 minutes at a time. Put a thin cloth between the ice and your skin. · Prop up the sore limb on a pillow when you ice it or anytime you sit or lie down during the next 3 days. Try to keep it above the level of your heart. This will help reduce swelling. · Rest sore joints. Avoid activities that put weight or strain on the joints for a few days. Take short rest breaks from your regular activities during the day. · Take your medicines exactly as prescribed. Call your doctor if you think you are having a problem with your medicine. · Take pain medicines exactly as directed. ? If the doctor gave you a prescription medicine for pain, take it as prescribed. ? If you are not taking a prescription pain medicine, ask your doctor if you can take an over-the-counter medicine. · Eat less seafood and red meat. · Check with your doctor before drinking alcohol.   · Losing weight, if you are overweight, may help reduce attacks of gout. But do not go on a [employer]. \" Losing a lot of weight in a short amount of time can cause a gout attack. When should you call for help? Call your doctor now or seek immediate medical care if:    · You have a fever.     · The joint is so painful you cannot use it.     · You have sudden, unexplained swelling, redness, warmth, or severe pain in one or more joints.    Watch closely for changes in your health, and be sure to contact your doctor if:    · You have joint pain.     · Your symptoms get worse or are not improving after 2 or 3 days. Where can you learn more? Go to http://israel-kathleen.info/. Enter V569 in the search box to learn more about \"Gout: Care Instructions. \"  Current as of: June 11, 2018  Content Version: 11.8  © 2163-9224 [employer]. Care instructions adapted under license by [employer] (which disclaims liability or warranty for this information). If you have questions about a medical condition or this instruction, always ask your healthcare professional. [address] any warranty or liability for your use of this information. Purine-Restricted Diet: Care Instructions  Your Care Instructions    Purines are substances that are found in some foods. Your body turns purines into uric acid. High levels of uric acid can cause gout, which is a form of arthritis that causes pain and inflammation in joints. You may be able to help control the amount of uric acid in your body by limiting high-purine foods in your diet. Follow-up care is a key part of your treatment and safety. Be sure to make and go to all appointments, and call your doctor if you are having problems. It's also a good idea to know your test results and keep a list of the medicines you take. How can you care for yourself at home?   · Plan your meals and snacks around foods that are low in purines and are safe for you to eat. These foods include:  ? Green vegetables and tomatoes. ? Fruits. ? Whole-grain breads, rice, and cereals. ? Eggs, peanut butter, and nuts. ? Low-fat milk, cheese, and other milk products. ? Popcorn. ? Gelatin desserts, chocolate, cocoa, and cakes and sweets, in small amounts. · You can eat certain foods that are medium-high in purines, but eat them only once in a while. These foods include:  ? Legumes, such as dried beans and dried peas. You can have 1 cup cooked legumes each day. ? Asparagus, cauliflower, spinach, mushrooms, and green peas. ? Fish and seafood (other than very high-purine seafood). ? Oatmeal, wheat bran, and wheat germ. · Limit very high-purine foods, including:  ? Organ meats, such as liver, kidneys, sweetbreads, and brains. ? Meats, including bustillos, beef, pork, and lamb. ? Game meats and any other meats in large amounts. ? Anchovies, sardines, herring, mackerel, and scallops. ? Gravy. ? Beer. Where can you learn more? Go to http://israel-kathleen.info/. Enter F448 in the search box to learn more about \"Purine-Restricted Diet: Care Instructions. \"  Current as of: March 29, 2018  Content Version: 11.8  © 8688-6168 [employer]. Care instructions adapted under license by [employer] (which disclaims liability or warranty for this information). If you have questions about a medical condition or this instruction, always ask your healthcare professional. [place] [address] any warranty or liability for your use of this information.

## 2018-12-26 NOTE — ED TRIAGE NOTES
Patient reports left foot/ankle pain for 1 week. Denies redness or swelling to site, as well as trauma or injury.  Ambulates to triage with cane

## 2018-12-26 NOTE — ED NOTES
I have reviewed discharge instructions with the patient. The patient verbalized understanding. Patient left ED via Discharge Method: ambulatory to Home with family). Opportunity for questions and clarification provided. Patient given 2 scripts. To continue your aftercare when you leave the hospital, you may receive an automated call from our care team to check in on how you are doing. This is a free service and part of our promise to provide the best care and service to meet your aftercare needs.  If you have questions, or wish to unsubscribe from this service please call [phone]. Thank you for Choosing our [facility] Emergency Department.

## 2019-02-02 ENCOUNTER — APPOINTMENT (OUTPATIENT)
Dept: CT IMAGING | Age: 59
End: 2019-02-02
Attending: EMERGENCY MEDICINE
Payer: MEDICARE

## 2019-02-02 ENCOUNTER — HOSPITAL ENCOUNTER (EMERGENCY)
Age: 59
Discharge: HOME | End: 2019-02-02
Attending: EMERGENCY MEDICINE
Payer: MEDICARE

## 2019-02-02 ENCOUNTER — APPOINTMENT (OUTPATIENT)
Dept: GENERAL RADIOLOGY | Age: 59
End: 2019-02-02
Attending: EMERGENCY MEDICINE
Payer: MEDICARE

## 2019-02-02 VITALS
OXYGEN SATURATION: 96 % | BODY MASS INDEX: 37.94 KG/M2 | RESPIRATION RATE: 18 BRPM | SYSTOLIC BLOOD PRESSURE: 116 MMHG | HEART RATE: 90 BPM | DIASTOLIC BLOOD PRESSURE: 72 MMHG | TEMPERATURE: 99 F | WEIGHT: 265 LBS | HEIGHT: 70 IN

## 2019-02-02 DIAGNOSIS — R60.9 PERIPHERAL EDEMA: Primary | ICD-10-CM

## 2019-02-02 LAB
ALBUMIN SERPL-MCNC: 4.3 G/DL (ref 3.5–5)
ALBUMIN/GLOB SERPL: 1 {RATIO} (ref 1.2–3.5)
ALP SERPL-CCNC: 126 U/L (ref 50–136)
ALT SERPL-CCNC: 66 U/L (ref 12–65)
ANION GAP SERPL CALC-SCNC: 7 MMOL/L (ref 7–16)
AST SERPL-CCNC: 37 U/L (ref 15–37)
BACTERIA URNS QL MICRO: 0 /HPF
BASOPHILS # BLD: 0.1 K/UL (ref 0–0.2)
BASOPHILS NFR BLD: 1 % (ref 0–2)
BILIRUB SERPL-MCNC: 0.3 MG/DL (ref 0.2–1.1)
BNP SERPL-MCNC: 53 PG/ML
BUN SERPL-MCNC: 15 MG/DL (ref 6–23)
CALCIUM SERPL-MCNC: 9.2 MG/DL (ref 8.3–10.4)
CASTS URNS QL MICRO: 0 /LPF
CHLORIDE SERPL-SCNC: 102 MMOL/L (ref 98–107)
CO2 SERPL-SCNC: 29 MMOL/L (ref 21–32)
CREAT SERPL-MCNC: 1.36 MG/DL (ref 0.8–1.5)
DIFFERENTIAL METHOD BLD: ABNORMAL
EOSINOPHIL # BLD: 0.1 K/UL (ref 0–0.8)
EOSINOPHIL NFR BLD: 1 % (ref 0.5–7.8)
EPI CELLS #/AREA URNS HPF: 0 /HPF
ERYTHROCYTE [DISTWIDTH] IN BLOOD BY AUTOMATED COUNT: 13.5 % (ref 11.9–14.6)
GLOBULIN SER CALC-MCNC: 4.4 G/DL (ref 2.3–3.5)
GLUCOSE SERPL-MCNC: 90 MG/DL (ref 65–100)
HCT VFR BLD AUTO: 42.2 % (ref 41.1–50.3)
HGB BLD-MCNC: 13.8 G/DL (ref 13.6–17.2)
IMM GRANULOCYTES # BLD AUTO: 0.1 K/UL (ref 0–0.5)
IMM GRANULOCYTES NFR BLD AUTO: 0 % (ref 0–5)
LIPASE SERPL-CCNC: 165 U/L (ref 73–393)
LYMPHOCYTES # BLD: 1.4 K/UL (ref 0.5–4.6)
LYMPHOCYTES NFR BLD: 11 % (ref 13–44)
MAGNESIUM SERPL-MCNC: 1.9 MG/DL (ref 1.8–2.4)
MCH RBC QN AUTO: 31.4 PG (ref 26.1–32.9)
MCHC RBC AUTO-ENTMCNC: 32.7 G/DL (ref 31.4–35)
MCV RBC AUTO: 95.9 FL (ref 79.6–97.8)
MONOCYTES # BLD: 0.9 K/UL (ref 0.1–1.3)
MONOCYTES NFR BLD: 8 % (ref 4–12)
NEUTS SEG # BLD: 9.9 K/UL (ref 1.7–8.2)
NEUTS SEG NFR BLD: 80 % (ref 43–78)
NRBC # BLD: 0 K/UL (ref 0–0.2)
PLATELET # BLD AUTO: 184 K/UL (ref 150–450)
PMV BLD AUTO: 10.9 FL (ref 9.4–12.3)
POTASSIUM SERPL-SCNC: 3.7 MMOL/L (ref 3.5–5.1)
PROT SERPL-MCNC: 8.7 G/DL (ref 6.3–8.2)
RBC # BLD AUTO: 4.4 M/UL (ref 4.23–5.6)
RBC #/AREA URNS HPF: NORMAL /HPF
SODIUM SERPL-SCNC: 138 MMOL/L (ref 136–145)
TROPONIN I SERPL-MCNC: 0.03 NG/ML (ref 0.02–0.05)
WBC # BLD AUTO: 12.3 K/UL (ref 4.3–11.1)
WBC URNS QL MICRO: 0 /HPF

## 2019-02-02 PROCEDURE — 81003 URINALYSIS AUTO W/O SCOPE: CPT | Performed by: EMERGENCY MEDICINE

## 2019-02-02 PROCEDURE — 99284 EMERGENCY DEPT VISIT MOD MDM: CPT | Performed by: EMERGENCY MEDICINE

## 2019-02-02 PROCEDURE — 71045 X-RAY EXAM CHEST 1 VIEW: CPT

## 2019-02-02 PROCEDURE — 80053 COMPREHEN METABOLIC PANEL: CPT

## 2019-02-02 PROCEDURE — 83735 ASSAY OF MAGNESIUM: CPT

## 2019-02-02 PROCEDURE — 81015 MICROSCOPIC EXAM OF URINE: CPT

## 2019-02-02 PROCEDURE — 36415 COLL VENOUS BLD VENIPUNCTURE: CPT

## 2019-02-02 PROCEDURE — 84484 ASSAY OF TROPONIN QUANT: CPT

## 2019-02-02 PROCEDURE — 83690 ASSAY OF LIPASE: CPT

## 2019-02-02 PROCEDURE — 85025 COMPLETE CBC W/AUTO DIFF WBC: CPT

## 2019-02-02 PROCEDURE — 74011250637 HC RX REV CODE- 250/637: Performed by: EMERGENCY MEDICINE

## 2019-02-02 PROCEDURE — 70450 CT HEAD/BRAIN W/O DYE: CPT

## 2019-02-02 PROCEDURE — 83880 ASSAY OF NATRIURETIC PEPTIDE: CPT

## 2019-02-02 RX ORDER — ACETAMINOPHEN 500 MG
1000 TABLET ORAL
Status: COMPLETED | OUTPATIENT
Start: 2019-02-02 | End: 2019-02-02

## 2019-02-02 RX ORDER — FUROSEMIDE 40 MG/1
40 TABLET ORAL DAILY
Qty: 20 TAB | Refills: 0 | Status: SHIPPED | OUTPATIENT
Start: 2019-02-02 | End: 2019-02-22

## 2019-02-02 RX ORDER — POTASSIUM CHLORIDE 750 MG/1
10 CAPSULE, EXTENDED RELEASE ORAL 2 TIMES DAILY
Qty: 40 CAP | Refills: 0 | Status: SHIPPED | OUTPATIENT
Start: 2019-02-02

## 2019-02-02 RX ADMIN — ACETAMINOPHEN 1000 MG: 500 TABLET, FILM COATED ORAL at 21:39

## 2019-02-02 NOTE — ED PROVIDER NOTES
Patient presents to the ER complaining of leg swelling. Reports he's had recurrence of leg swelling throughout the past couple of  Weeks. Reports swelling is worse as the day goes on. Does report some occasional shortness of breath. Denies any chest pain. Reports a history of gout. The history is provided by the patient. Leg Swelling This is a recurrent problem. The current episode started more than 1 week ago. The problem occurs daily. The problem has not changed since onset. The pain is present in the right lower leg and left lower leg. The quality of the pain is described as aching. The pain is at a severity of 2/10. The pain is mild. Associated symptoms include stiffness. Pertinent negatives include no numbness. There has been no history of extremity trauma. Past Medical History:  
Diagnosis Date  Cancer [facility])   
 colon cancer  Gastrointestinal disorder GERD  Hypertension  Ill-defined condition   
 blood clot neck Past Surgical History:  
Procedure Laterality Date  ABDOMEN SURGERY PROC UNLISTED    
 colon ca  HX ORTHOPAEDIC    
 back] History reviewed. No pertinent family history. Social History Socioeconomic History  Marital status: LEGALLY  Spouse name: Not on file  Number of children: Not on file  Years of education: Not on file  Highest education level: Not on file Social Needs  Financial resource strain: Not on file  Food insecurity - worry: Not on file  Food insecurity - inability: Not on file  Transportation needs - medical: Not on file  Transportation needs - non-medical: Not on file Occupational History  Not on file Tobacco Use  Smoking status: Never Smoker Substance and Sexual Activity  Alcohol use: No  
 Drug use: No  
 Sexual activity: Not on file Other Topics Concern  Not on file Social History Narrative  Not on file ALLERGIES: Patient has no known allergies. Review of Systems Constitutional: Negative for fatigue, fever and unexpected weight change. HENT: Negative for congestion, dental problem, trouble swallowing and voice change. Eyes: Negative for photophobia, redness and visual disturbance. Respiratory: Negative for chest tightness and shortness of breath. Cardiovascular: Positive for leg swelling. Negative for chest pain. Gastrointestinal: Negative for abdominal pain and constipation. Endocrine: Negative for polyphagia. Genitourinary: Negative for frequency. Musculoskeletal: Positive for stiffness. Negative for arthralgias and gait problem. Skin: Negative for pallor and rash. Allergic/Immunologic: Negative for food allergies and immunocompromised state. Neurological: Negative for light-headedness and numbness. Hematological: Negative for adenopathy. Does not bruise/bleed easily. Psychiatric/Behavioral: Negative for behavioral problems and confusion. All other systems reviewed and are negative. Vitals:  
 02/02/19 1725 BP: (!) 177/138 Pulse: 84 Resp: 18 Temp: 99 °F (37.2 °C) SpO2: 99% Weight: 120.2 kg (265 lb) Height: 5' 10\" (1.778 m) Physical Exam  
Constitutional: He is oriented to person, place, and time. He appears well-developed and well-nourished. HENT:  
Head: Normocephalic and atraumatic. Eyes: Conjunctivae and EOM are normal. Pupils are equal, round, and reactive to light. Neck: Normal range of motion. Neck supple. No tracheal deviation present. No thyromegaly present. Cardiovascular: Normal rate, regular rhythm, normal heart sounds and intact distal pulses. Exam reveals no friction rub. No murmur heard. Pulmonary/Chest: Effort normal and breath sounds normal. No stridor. No respiratory distress. Abdominal: Soft. Bowel sounds are normal. He exhibits no distension. There is no tenderness. Musculoskeletal: Normal range of motion. He exhibits edema. He exhibits no deformity. 2+ lower extremity edema Neurological: He is alert and oriented to person, place, and time. No cranial nerve deficit. Coordination normal.  
Nursing note and vitals reviewed. MDM Number of Diagnoses or Management Options Diagnosis management comments: Differential diagnoses include CHF, volume overload, electrolyte abnormality 10:57 PM 
Labs show white count 12.3, normal hemoglobin Normal chemistry panel. Chest x-ray is read as clear. Discussed with patient. Results of testing. We'll place on a course of Lasix and potassium. Encourage close follow-up with the [facility] as well as [facility] Amount and/or Complexity of Data Reviewed Clinical lab tests: ordered and reviewed Tests in the radiology section of CPT®: ordered and reviewed Risk of Complications, Morbidity, and/or Mortality Presenting problems: moderate Diagnostic procedures: low Management options: low Patient Progress Patient progress: stable Procedures Results Include: 
 
Recent Results (from the past 24 hour(s)) CBC WITH AUTOMATED DIFF Collection Time: 02/02/19  8:36 PM  
Result Value Ref Range WBC 12.3 (H) 4.3 - 11.1 K/uL  
 RBC 4.40 4.23 - 5.6 M/uL  
 HGB 13.8 13.6 - 17.2 g/dL HCT 42.2 41.1 - 50.3 % MCV 95.9 79.6 - 97.8 FL  
 MCH 31.4 26.1 - 32.9 PG  
 MCHC 32.7 31.4 - 35.0 g/dL  
 RDW 13.5 11.9 - 14.6 % PLATELET 260 979 - 448 K/uL MPV 10.9 9.4 - 12.3 FL ABSOLUTE NRBC 0.00 0.0 - 0.2 K/uL  
 DF AUTOMATED NEUTROPHILS 80 (H) 43 - 78 % LYMPHOCYTES 11 (L) 13 - 44 % MONOCYTES 8 4.0 - 12.0 % EOSINOPHILS 1 0.5 - 7.8 % BASOPHILS 1 0.0 - 2.0 % IMMATURE GRANULOCYTES 0 0.0 - 5.0 %  
 ABS. NEUTROPHILS 9.9 (H) 1.7 - 8.2 K/UL  
 ABS. LYMPHOCYTES 1.4 0.5 - 4.6 K/UL  
 ABS. MONOCYTES 0.9 0.1 - 1.3 K/UL  
 ABS. EOSINOPHILS 0.1 0.0 - 0.8 K/UL  
 ABS. BASOPHILS 0.1 0.0 - 0.2 K/UL  
 ABS. IMM. GRANS. 0.1 0.0 - 0.5 K/UL METABOLIC PANEL, COMPREHENSIVE  
 Collection Time: 02/02/19  8:36 PM  
Result Value Ref Range Sodium 138 136 - 145 mmol/L Potassium 3.7 3.5 - 5.1 mmol/L Chloride 102 98 - 107 mmol/L  
 CO2 29 21 - 32 mmol/L Anion gap 7 7 - 16 mmol/L Glucose 90 65 - 100 mg/dL BUN 15 6 - 23 MG/DL Creatinine 1.36 0.8 - 1.5 MG/DL  
 GFR est AA >60 >60 ml/min/1.73m2 GFR est non-AA 57 (L) >60 ml/min/1.73m2 Calcium 9.2 8.3 - 10.4 MG/DL Bilirubin, total 0.3 0.2 - 1.1 MG/DL  
 ALT (SGPT) 66 (H) 12 - 65 U/L  
 AST (SGOT) 37 15 - 37 U/L Alk. phosphatase 126 50 - 136 U/L Protein, total 8.7 (H) 6.3 - 8.2 g/dL Albumin 4.3 3.5 - 5.0 g/dL Globulin 4.4 (H) 2.3 - 3.5 g/dL A-G Ratio 1.0 (L) 1.2 - 3.5 LIPASE Collection Time: 02/02/19  8:36 PM  
Result Value Ref Range Lipase 165 73 - 393 U/L MAGNESIUM Collection Time: 02/02/19  8:36 PM  
Result Value Ref Range Magnesium 1.9 1.8 - 2.4 mg/dL BNP Collection Time: 02/02/19  8:36 PM  
Result Value Ref Range BNP 53 (H) 0 pg/mL TROPONIN I Collection Time: 02/02/19  8:36 PM  
Result Value Ref Range Troponin-I, Qt. 0.03 0.02 - 0.05 NG/ML  
URINE MICROSCOPIC Collection Time: 02/02/19 10:06 PM  
Result Value Ref Range WBC 0 0 /hpf  
 RBC 5-10 0 /hpf Epithelial cells 0 0 /hpf Bacteria 0 0 /hpf Casts 0 0 /lpf Voice dictation software was used during the making of this note. This software is not perfect and grammatical and other typographical errors may be present. This note has been proofread, but may still contain errors.  
Anthony Nieves MD; 2/2/2019 @10:58 PM  
===================================================================

## 2019-02-02 NOTE — ED TRIAGE NOTES
Patient complains of pain and swelling to both legs that is worse in the mornings for the past 1 month that has been getting progressively worse. Patient states he has also had swelling in his hands intermittently, headache, body aches, and blood in his stool with lower abdominal cramping that he first noticed today.

## 2019-02-03 NOTE — DISCHARGE INSTRUCTIONS
Take medications as prescribed  Follow-up with your primary care physician's  Return to the ER for any new or worsening symptoms    Leg and Ankle Edema: Care Instructions  Your Care Instructions  Swelling in the legs, ankles, and feet is called edema. It is common after you sit or stand for a while. Long plane flights or car rides often cause swelling in the legs and feet. You may also have swelling if you have to stand for long periods of time at your job. Problems with the veins in the legs (varicose veins) and changes in hormones can also cause swelling. Sometimes the swelling in the ankles and feet is caused by a more serious problem, such as heart failure, infection, blood clots, or liver or kidney disease. Follow-up care is a key part of your treatment and safety. Be sure to make and go to all appointments, and call your doctor if you are having problems. It's also a good idea to know your test results and keep a list of the medicines you take. How can you care for yourself at home? · If your doctor gave you medicine, take it as prescribed. Call your doctor if you think you are having a problem with your medicine. · Whenever you are resting, raise your legs up. Try to keep the swollen area higher than the level of your heart. · Take breaks from standing or sitting in one position. ? Walk around to increase the blood flow in your lower legs. ? Move your feet and ankles often while you stand, or tighten and relax your leg muscles. · Wear support stockings. Put them on in the morning, before swelling gets worse. · Eat a balanced diet. Lose weight if you need to. · Limit the amount of salt (sodium) in your diet. Salt holds fluid in the body and may increase swelling. When should you call for help? Call 911 anytime you think you may need emergency care. For example, call if:    · You have symptoms of a blood clot in your lung (called a pulmonary embolism).  These may include:  ? Sudden chest pain.  ? Trouble breathing. ? Coughing up blood.    Call your doctor now or seek immediate medical care if:    · You have signs of a blood clot, such as:  ? Pain in your calf, back of the knee, thigh, or groin. ? Redness and swelling in your leg or groin.     · You have symptoms of infection, such as:  ? Increased pain, swelling, warmth, or redness. ? Red streaks or pus. ? A fever.    Watch closely for changes in your health, and be sure to contact your doctor if:    · Your swelling is getting worse.     · You have new or worsening pain in your legs.     · You do not get better as expected. Where can you learn more? Go to http://israel-kathleen.info/. Enter D945 in the search box to learn more about \"Leg and Ankle Edema: Care Instructions. \"  Current as of: September 23, 2018  Content Version: 11.9  © 1512-1452 [employer]. Care instructions adapted under license by [email or web address] (which disclaims liability or warranty for this information). If you have questions about a medical condition or this instruction, always ask your healthcare professional. [place] [address] any warranty or liability for your use of this information.

## 2019-02-03 NOTE — ED NOTES
I have reviewed discharge instructions with the patient. The patient verbalized understanding. Patient left ED via Discharge Method: ambulatory to Home with family. Opportunity for questions and clarification provided. Patient given 2 scripts. To continue your aftercare when you leave the hospital, you may receive an automated call from our care team to check in on how you are doing. This is a free service and part of our promise to provide the best care and service to meet your aftercare needs.  If you have questions, or wish to unsubscribe from this service please call [phone]. Thank you for Choosing our [facility] Emergency Department.

## 2019-03-12 ENCOUNTER — APPOINTMENT (OUTPATIENT)
Dept: GENERAL RADIOLOGY | Age: 59
End: 2019-03-12
Attending: STUDENT IN AN ORGANIZED HEALTH CARE EDUCATION/TRAINING PROGRAM
Payer: MEDICARE

## 2019-03-12 ENCOUNTER — HOSPITAL ENCOUNTER (EMERGENCY)
Age: 59
Discharge: HOME | End: 2019-03-13
Attending: STUDENT IN AN ORGANIZED HEALTH CARE EDUCATION/TRAINING PROGRAM
Payer: MEDICARE

## 2019-03-12 DIAGNOSIS — S39.012A STRAIN OF LUMBAR REGION, INITIAL ENCOUNTER: ICD-10-CM

## 2019-03-12 DIAGNOSIS — M10.9 ACUTE GOUT OF MULTIPLE SITES, UNSPECIFIED CAUSE: Primary | ICD-10-CM

## 2019-03-12 LAB
ALBUMIN SERPL-MCNC: 3.6 G/DL (ref 3.5–5)
ALBUMIN/GLOB SERPL: 0.8 {RATIO} (ref 1.2–3.5)
ALP SERPL-CCNC: 69 U/L (ref 50–136)
ALT SERPL-CCNC: 20 U/L (ref 12–65)
ANION GAP SERPL CALC-SCNC: 8 MMOL/L (ref 7–16)
AST SERPL-CCNC: 14 U/L (ref 15–37)
BACTERIA URNS QL MICRO: 0 /HPF
BASOPHILS # BLD: 0.1 K/UL (ref 0–0.2)
BASOPHILS NFR BLD: 1 % (ref 0–2)
BILIRUB SERPL-MCNC: 0.3 MG/DL (ref 0.2–1.1)
BUN SERPL-MCNC: 27 MG/DL (ref 6–23)
CALCIUM SERPL-MCNC: 9.5 MG/DL (ref 8.3–10.4)
CASTS URNS QL MICRO: NORMAL /LPF
CHLORIDE SERPL-SCNC: 101 MMOL/L (ref 98–107)
CO2 SERPL-SCNC: 26 MMOL/L (ref 21–32)
CREAT SERPL-MCNC: 1.29 MG/DL (ref 0.8–1.5)
DIFFERENTIAL METHOD BLD: ABNORMAL
EOSINOPHIL # BLD: 0.2 K/UL (ref 0–0.8)
EOSINOPHIL NFR BLD: 2 % (ref 0.5–7.8)
EPI CELLS #/AREA URNS HPF: 0 /HPF
ERYTHROCYTE [DISTWIDTH] IN BLOOD BY AUTOMATED COUNT: 12.8 % (ref 11.9–14.6)
GLOBULIN SER CALC-MCNC: 4.8 G/DL (ref 2.3–3.5)
GLUCOSE SERPL-MCNC: 105 MG/DL (ref 65–100)
HCT VFR BLD AUTO: 38.7 % (ref 41.1–50.3)
HGB BLD-MCNC: 12.7 G/DL (ref 13.6–17.2)
IMM GRANULOCYTES # BLD AUTO: 0 K/UL (ref 0–0.5)
IMM GRANULOCYTES NFR BLD AUTO: 0 % (ref 0–5)
LYMPHOCYTES # BLD: 1.8 K/UL (ref 0.5–4.6)
LYMPHOCYTES NFR BLD: 17 % (ref 13–44)
MCH RBC QN AUTO: 30 PG (ref 26.1–32.9)
MCHC RBC AUTO-ENTMCNC: 32.8 G/DL (ref 31.4–35)
MCV RBC AUTO: 91.3 FL (ref 79.6–97.8)
MONOCYTES # BLD: 1.1 K/UL (ref 0.1–1.3)
MONOCYTES NFR BLD: 11 % (ref 4–12)
NEUTS SEG # BLD: 7.4 K/UL (ref 1.7–8.2)
NEUTS SEG NFR BLD: 70 % (ref 43–78)
NRBC # BLD: 0 K/UL (ref 0–0.2)
PLATELET # BLD AUTO: 175 K/UL (ref 150–450)
PMV BLD AUTO: 11 FL (ref 9.4–12.3)
POTASSIUM SERPL-SCNC: 3.7 MMOL/L (ref 3.5–5.1)
PROT SERPL-MCNC: 8.4 G/DL (ref 6.3–8.2)
RBC # BLD AUTO: 4.24 M/UL (ref 4.23–5.6)
RBC #/AREA URNS HPF: NORMAL /HPF
SODIUM SERPL-SCNC: 135 MMOL/L (ref 136–145)
WBC # BLD AUTO: 10.7 K/UL (ref 4.3–11.1)
WBC URNS QL MICRO: NORMAL /HPF

## 2019-03-12 PROCEDURE — 99284 EMERGENCY DEPT VISIT MOD MDM: CPT | Performed by: STUDENT IN AN ORGANIZED HEALTH CARE EDUCATION/TRAINING PROGRAM

## 2019-03-12 PROCEDURE — 96374 THER/PROPH/DIAG INJ IV PUSH: CPT | Performed by: STUDENT IN AN ORGANIZED HEALTH CARE EDUCATION/TRAINING PROGRAM

## 2019-03-12 PROCEDURE — 96375 TX/PRO/DX INJ NEW DRUG ADDON: CPT | Performed by: STUDENT IN AN ORGANIZED HEALTH CARE EDUCATION/TRAINING PROGRAM

## 2019-03-12 PROCEDURE — 80053 COMPREHEN METABOLIC PANEL: CPT

## 2019-03-12 PROCEDURE — 81015 MICROSCOPIC EXAM OF URINE: CPT

## 2019-03-12 PROCEDURE — 85025 COMPLETE CBC W/AUTO DIFF WBC: CPT

## 2019-03-12 PROCEDURE — 72100 X-RAY EXAM L-S SPINE 2/3 VWS: CPT

## 2019-03-12 PROCEDURE — 81003 URINALYSIS AUTO W/O SCOPE: CPT | Performed by: STUDENT IN AN ORGANIZED HEALTH CARE EDUCATION/TRAINING PROGRAM

## 2019-03-12 PROCEDURE — 74011250636 HC RX REV CODE- 250/636: Performed by: STUDENT IN AN ORGANIZED HEALTH CARE EDUCATION/TRAINING PROGRAM

## 2019-03-12 PROCEDURE — 36415 COLL VENOUS BLD VENIPUNCTURE: CPT

## 2019-03-12 RX ORDER — PREDNISONE 20 MG/1
40 TABLET ORAL DAILY
Qty: 8 TAB | Refills: 0 | Status: SHIPPED | OUTPATIENT
Start: 2019-03-12 | End: 2019-03-16

## 2019-03-12 RX ORDER — HYDROMORPHONE HYDROCHLORIDE 1 MG/ML
1 INJECTION, SOLUTION INTRAMUSCULAR; INTRAVENOUS; SUBCUTANEOUS
Status: COMPLETED | OUTPATIENT
Start: 2019-03-12 | End: 2019-03-12

## 2019-03-12 RX ORDER — DICLOFENAC SODIUM 10 MG/G
4 GEL TOPICAL 4 TIMES DAILY
Qty: 100 G | Refills: 0 | Status: SHIPPED | OUTPATIENT
Start: 2019-03-12 | End: 2019-03-19

## 2019-03-12 RX ORDER — OXYCODONE HYDROCHLORIDE 5 MG/1
5 TABLET ORAL
Qty: 15 TAB | Refills: 0 | Status: SHIPPED | OUTPATIENT
Start: 2019-03-12 | End: 2019-03-15

## 2019-03-12 RX ORDER — ONDANSETRON 2 MG/ML
4 INJECTION INTRAMUSCULAR; INTRAVENOUS
Status: COMPLETED | OUTPATIENT
Start: 2019-03-12 | End: 2019-03-12

## 2019-03-12 RX ADMIN — ONDANSETRON 4 MG: 2 INJECTION INTRAMUSCULAR; INTRAVENOUS at 22:17

## 2019-03-12 RX ADMIN — HYDROMORPHONE HYDROCHLORIDE 1 MG: 1 INJECTION, SOLUTION INTRAMUSCULAR; INTRAVENOUS; SUBCUTANEOUS at 22:17

## 2019-03-12 NOTE — ED TRIAGE NOTES
[facility] brought pt in from home with ongoing back pain. Pt has history back pain and GERD. Pt states he cannot get around his house. Vitals were stable with EMS.

## 2019-03-12 NOTE — ED TRIAGE NOTES
PT sts\" My right foot is painful and swollen and my calf is swole and my back is sore on the left side. \"    Onset of sx Sunday. Pt had some chronic pain from a surgery from a cynovial cyst back 9 years ago.  \"it feels like it's starting all over again now\"

## 2019-03-13 VITALS
SYSTOLIC BLOOD PRESSURE: 124 MMHG | DIASTOLIC BLOOD PRESSURE: 78 MMHG | HEART RATE: 72 BPM | TEMPERATURE: 98.3 F | BODY MASS INDEX: 36.36 KG/M2 | OXYGEN SATURATION: 98 % | WEIGHT: 254 LBS | RESPIRATION RATE: 16 BRPM | HEIGHT: 70 IN

## 2019-03-13 NOTE — ED PROVIDER NOTES
60-year-old male patient presents with multiple medical complaints including right foot and ankle pain and swelling. Low back pain over the midline and left flank. Also reports urinary frequency. Patient reports difficulty cannulating secondary to back pain and foot pain. He does report a history of gout and feels that this may explain his discomfort over the right foot and ankle. His gout has flared in this area before. He denies any injury or heavy lifting. He does report recent increase in walking for exercise. There is no report of trauma to the patient's back. Reports a history of a surgery for a sebaceous cyst in the low back. He feels similar symptoms at this time as he did prior to that procedure. There is no associated numbness, tingling or weakness. His limitations are secondary to pain only. Patient reports urinary frequency but denies dysuria, loss of bowel or bladder control. There is no urinary retention. No associated fever or chills. No chest pain, shortness of breath. Patient takes allopurinol for his gout. He denies any other medication for pain at this time. The history is provided by the patient. No  was used. Back Pain    This is a new problem. The current episode started yesterday. The problem has not changed since onset. The problem occurs constantly. Patient reports not work related injury. The pain is associated with no known injury. The quality of the pain is described as aching and shooting. The pain does not radiate. The pain is moderate. The symptoms are aggravated by twisting, bending and certain positions. Pertinent negatives include no chest pain, no fever, no numbness, no weight loss, no headaches, no abdominal pain, no abdominal swelling, no bowel incontinence, no perianal numbness, no bladder incontinence, no dysuria, no pelvic pain, no leg pain, no paresthesias, no paresis, no tingling and no weakness.  He has tried NSAIDs for the symptoms. The treatment provided no relief. Past Medical History:   Diagnosis Date    Cancer [facility])     colon cancer    Gastrointestinal disorder     GERD    Hypertension     Ill-defined condition     blood clot neck       Past Surgical History:   Procedure Laterality Date    ABDOMEN SURGERY PROC UNLISTED      colon ca    HX ORTHOPAEDIC      back]         No family history on file. Social History     Socioeconomic History    Marital status: LEGALLY      Spouse name: Not on file    Number of children: Not on file    Years of education: Not on file    Highest education level: Not on file   Social Needs    Financial resource strain: Not on file    Food insecurity - worry: Not on file    Food insecurity - inability: Not on file    Transportation needs - medical: Not on file   [place] needs - non-medical: Not on file   Occupational History    Not on file   Tobacco Use    Smoking status: Never Smoker   Substance and Sexual Activity    Alcohol use: No    Drug use: No    Sexual activity: Not on file   Other Topics Concern    Not on file   Social History Narrative    Not on file         ALLERGIES: Patient has no known allergies. Review of Systems   Constitutional: Negative for chills, diaphoresis, fever and weight loss. HENT: Negative for congestion, sneezing and sore throat. Eyes: Negative for visual disturbance. Respiratory: Negative for cough, chest tightness, shortness of breath and wheezing. Cardiovascular: Negative for chest pain and leg swelling. Gastrointestinal: Negative for abdominal pain, blood in stool, bowel incontinence, diarrhea, nausea and vomiting. Endocrine: Negative for polyuria. Genitourinary: Positive for frequency. Negative for bladder incontinence, difficulty urinating, dysuria, flank pain, hematuria, pelvic pain and urgency. Musculoskeletal: Positive for arthralgias, back pain and joint swelling.  Negative for myalgias, neck pain and neck stiffness. Skin: Negative for color change and rash. Neurological: Negative for dizziness, tingling, syncope, speech difficulty, weakness, light-headedness, numbness, headaches and paresthesias. Psychiatric/Behavioral: Negative for behavioral problems. All other systems reviewed and are negative. Vitals:    03/12/19 1930 03/12/19 2040   BP: 117/80    Pulse: 80    Resp: 16    Temp: 98.4 °F (36.9 °C)    SpO2: 97% 97%   Weight: 115.2 kg (254 lb)    Height: 5' 10\" (1.778 m)             Physical Exam   Constitutional: He is oriented to person, place, and time. He appears well-developed and well-nourished. No distress. Alert and oriented to person place and time. No acute distress, speaks in clear, fluid sentences. Appears uncomfortable   HENT:   Head: Normocephalic and atraumatic. Right Ear: External ear normal.   Left Ear: External ear normal.   Nose: Nose normal.   Eyes: EOM are normal. Pupils are equal, round, and reactive to light. Neck: Normal range of motion. Cardiovascular: Normal rate, regular rhythm, normal heart sounds and intact distal pulses. Exam reveals no gallop and no friction rub. No murmur heard. Pulmonary/Chest: Effort normal and breath sounds normal. No respiratory distress. He has no wheezes. He has no rales. He exhibits no tenderness. Abdominal: Soft. He exhibits no distension and no mass. There is no tenderness. There is no rebound and no guarding. No hernia. Musculoskeletal: Normal range of motion. He exhibits no edema, tenderness or deformity. There is increased low back pain with any movement. Patient struggles to sit upright secondary to discomfort in his low back. There is reproducible pain with palpation of the midline lumbar spine palpated no step-off or deformity. Well-healed surgical scar overlies the lumbar spine on exam.  There slight CVA tenderness over the left side.     Evaluation the patient's right foot and ankle reveals diffuse swelling and some faint redness and pain on exam.  Pulses are intact with brisk capillary refill present. Neurological: He is alert and oriented to person, place, and time. He has normal reflexes. No cranial nerve deficit. Skin: Skin is warm and dry. He is not diaphoretic. Nursing note and vitals reviewed.        MDM       Procedures

## 2019-03-13 NOTE — ED NOTES
I have reviewed discharge instructions with the patient. The patient verbalized understanding. Patient left ED via Discharge Method: ambulatory to Home with daughter. Opportunity for questions and clarification provided. Patient given 3 scripts. To continue your aftercare when you leave the hospital, you may receive an automated call from our care team to check in on how you are doing. This is a free service and part of our promise to provide the best care and service to meet your aftercare needs.  If you have questions, or wish to unsubscribe from this service please call [phone]. Thank you for Choosing our [facility] Emergency Department.

## 2019-03-13 NOTE — DISCHARGE INSTRUCTIONS
Patient Education   take the medication prescribed as directed. Drink plenty of clear liquids to try hydration. Given follow-up referral with a local spinal specialist.  Please make arrangements to see the specialist as discussed. Right follow-up with your primary care as discussed as well. Gout: Care Instructions  Your Care Instructions    Gout is a form of arthritis caused by a buildup of uric acid crystals in a joint. It causes sudden attacks of pain, swelling, redness, and stiffness, usually in one joint, especially the big toe. Gout usually comes on without a cause. But it can be brought on by drinking alcohol (especially beer) or eating seafood and red meat. Taking certain medicines, such as diuretics or aspirin, also can bring on an attack of gout. Taking your medicines as prescribed and following up with your doctor regularly can help you avoid gout attacks in the future. Follow-up care is a key part of your treatment and safety. Be sure to make and go to all appointments, and call your doctor if you are having problems. It's also a good idea to know your test results and keep a list of the medicines you take. How can you care for yourself at home? · If the joint is swollen, put ice or a cold pack on the area for 10 to 20 minutes at a time. Put a thin cloth between the ice and your skin. · Prop up the sore limb on a pillow when you ice it or anytime you sit or lie down during the next 3 days. Try to keep it above the level of your heart. This will help reduce swelling. · Rest sore joints. Avoid activities that put weight or strain on the joints for a few days. Take short rest breaks from your regular activities during the day. · Take your medicines exactly as prescribed. Call your doctor if you think you are having a problem with your medicine. · Take pain medicines exactly as directed. ? If the doctor gave you a prescription medicine for pain, take it as prescribed.   ? If you are not taking a prescription pain medicine, ask your doctor if you can take an over-the-counter medicine. · Eat less seafood and red meat. · Check with your doctor before drinking alcohol. · Losing weight, if you are overweight, may help reduce attacks of gout. But do not go on a [employer]. \" Losing a lot of weight in a short amount of time can cause a gout attack. When should you call for help? Call your doctor now or seek immediate medical care if:    · You have a fever.     · The joint is so painful you cannot use it.     · You have sudden, unexplained swelling, redness, warmth, or severe pain in one or more joints.    Watch closely for changes in your health, and be sure to contact your doctor if:    · You have joint pain.     · Your symptoms get worse or are not improving after 2 or 3 days. Where can you learn more? Go to http://israel-kathleen.info/. Enter Z568 in the search box to learn more about \"Gout: Care Instructions. \"  Current as of: Princess 10, 2018  Content Version: 11.9  © 9711-2153 [employer]. Care instructions adapted under license by [employer] (which disclaims liability or warranty for this information). If you have questions about a medical condition or this instruction, always ask your healthcare professional. [address] any warranty or liability for your use of this information. Patient Education        Back Strain: Care Instructions  Overview    A back strain happens when you overstretch, or pull, a muscle in your back. You may hurt your back in an accident or when you exercise or lift something. Sometimes you may not know how you hurt your back. Most back pain will get better with rest and time. You can take care of yourself at home to help your back heal.  Follow-up care is a key part of your treatment and safety. Be sure to make and go to all appointments, and call your doctor if you are having problems.  It's also a good idea to know your test results and keep a list of the medicines you take. How can you care for yourself at home? · Try to stay as active as you can, but stop or reduce any activity that causes pain. · Put ice or a cold pack on the sore muscle for 10 to 20 minutes at a time to stop swelling. Try this every 1 to 2 hours for 3 days (when you are awake) or until the swelling goes down. Put a thin cloth between the ice pack and your skin. · After 2 or 3 days, apply a heating pad on low or a warm cloth to your back. Some doctors suggest that you go back and forth between hot and cold treatments. · Take pain medicines exactly as directed. ? If the doctor gave you a prescription medicine for pain, take it as prescribed. ? If you are not taking a prescription pain medicine, ask your doctor if you can take an over-the-counter medicine. · Try sleeping on your side with a pillow between your legs. Or put a pillow under your knees when you lie on your back. These measures can ease pain in your lower back. · Return to your usual level of activity slowly. When should you call for help? Call 911 anytime you think you may need emergency care. For example, call if:    · You are unable to move a leg at all.   [facility] your doctor now or seek immediate medical care if:    · You have new or worse symptoms in your legs, belly, or buttocks. Symptoms may include:  ? Numbness or tingling. ? Weakness. ? Pain.     · You lose bladder or bowel control.    Watch closely for changes in your health, and be sure to contact your doctor if:    · You have a fever, lose weight, or don't feel well.     · You are not getting better as expected. Where can you learn more? Go to http://israel-kathleen.info/. Enter R418 in the search box to learn more about \"Back Strain: Care Instructions. \"  Current as of: September 20, 2018  Content Version: 11.9  © 2261-8902 [employer].  Care instructions adapted under license by [employer] (which disclaims liability or warranty for this information). If you have questions about a medical condition or this instruction, always ask your healthcare professional. Viktoria Acevedo any warranty or liability for your use of this information. Patient Education        Purine-Restricted Diet: Care Instructions  Your Care Instructions    Purines are substances that are found in some foods. Your body turns purines into uric acid. High levels of uric acid can cause gout, which is a form of arthritis that causes pain and inflammation in joints. You may be able to help control the amount of uric acid in your body by limiting high-purine foods in your diet. Follow-up care is a key part of your treatment and safety. Be sure to make and go to all appointments, and call your doctor if you are having problems. It's also a good idea to know your test results and keep a list of the medicines you take. How can you care for yourself at home? · Plan your meals and snacks around foods that are low in purines and are safe for you to eat. These foods include:  ? Green vegetables and tomatoes. ? Fruits. ? Whole-grain breads, rice, and cereals. ? Eggs, peanut butter, and nuts. ? Low-fat milk, cheese, and other milk products. ? Popcorn. ? Gelatin desserts, chocolate, cocoa, and cakes and sweets, in small amounts. · You can eat certain foods that are medium-high in purines, but eat them only once in a while. These foods include:  ? Legumes, such as dried beans and dried peas. You can have 1 cup cooked legumes each day. ? Asparagus, cauliflower, spinach, mushrooms, and green peas. ? Fish and seafood (other than very high-purine seafood). ? Oatmeal, wheat bran, and wheat germ. · Limit very high-purine foods, including:  ? Organ meats, such as liver, kidneys, sweetbreads, and brains. ? Meats, including bustillos, beef, pork, and lamb.   ? Game meats and any other meats in large amounts. ? Anchovies, sardines, herring, mackerel, and scallops. ? Gravy. ? Beer. Where can you learn more? Go to http://israel-kathleen.info/. Enter F448 in the search box to learn more about \"Purine-Restricted Diet: Care Instructions. \"  Current as of: March 28, 2018  Content Version: 11.9  © 3392-6837 [employer]. Care instructions adapted under license by [employer] (which disclaims liability or warranty for this information). If you have questions about a medical condition or this instruction, always ask your healthcare professional. [address] any warranty or liability for your use of this information.

## 2019-08-05 ENCOUNTER — APPOINTMENT (OUTPATIENT)
Dept: CT IMAGING | Age: 59
End: 2019-08-05
Attending: EMERGENCY MEDICINE
Payer: MEDICARE

## 2019-08-05 ENCOUNTER — APPOINTMENT (OUTPATIENT)
Dept: GENERAL RADIOLOGY | Age: 59
End: 2019-08-05
Attending: EMERGENCY MEDICINE
Payer: MEDICARE

## 2019-08-05 ENCOUNTER — HOSPITAL ENCOUNTER (EMERGENCY)
Age: 59
Discharge: HOME | End: 2019-08-05
Attending: EMERGENCY MEDICINE
Payer: MEDICARE

## 2019-08-05 VITALS
BODY MASS INDEX: 35.07 KG/M2 | OXYGEN SATURATION: 95 % | WEIGHT: 245 LBS | HEIGHT: 70 IN | RESPIRATION RATE: 16 BRPM | TEMPERATURE: 97.9 F | DIASTOLIC BLOOD PRESSURE: 75 MMHG | SYSTOLIC BLOOD PRESSURE: 140 MMHG | HEART RATE: 88 BPM

## 2019-08-05 DIAGNOSIS — R20.2 NUMBNESS AND TINGLING: Primary | ICD-10-CM

## 2019-08-05 DIAGNOSIS — R20.0 NUMBNESS AND TINGLING: Primary | ICD-10-CM

## 2019-08-05 LAB
ALBUMIN SERPL-MCNC: 3.9 G/DL (ref 3.5–5)
ALBUMIN/GLOB SERPL: 0.9 {RATIO} (ref 1.2–3.5)
ALP SERPL-CCNC: 132 U/L (ref 50–136)
ALT SERPL-CCNC: 30 U/L (ref 12–65)
ANION GAP SERPL CALC-SCNC: 9 MMOL/L (ref 7–16)
AST SERPL-CCNC: 34 U/L (ref 15–37)
BASOPHILS # BLD: 0.1 K/UL (ref 0–0.2)
BASOPHILS NFR BLD: 2 % (ref 0–2)
BILIRUB SERPL-MCNC: 0.3 MG/DL (ref 0.2–1.1)
BUN SERPL-MCNC: 11 MG/DL (ref 6–23)
CALCIUM SERPL-MCNC: 8.8 MG/DL (ref 8.3–10.4)
CHLORIDE SERPL-SCNC: 107 MMOL/L (ref 98–107)
CO2 SERPL-SCNC: 22 MMOL/L (ref 21–32)
CREAT SERPL-MCNC: 1.13 MG/DL (ref 0.8–1.5)
DIFFERENTIAL METHOD BLD: ABNORMAL
EOSINOPHIL # BLD: 0.1 K/UL (ref 0–0.8)
EOSINOPHIL NFR BLD: 3 % (ref 0.5–7.8)
ERYTHROCYTE [DISTWIDTH] IN BLOOD BY AUTOMATED COUNT: 12.8 % (ref 11.9–14.6)
GLOBULIN SER CALC-MCNC: 4.3 G/DL (ref 2.3–3.5)
GLUCOSE SERPL-MCNC: 89 MG/DL (ref 65–100)
HCT VFR BLD AUTO: 48.1 % (ref 41.1–50.3)
HGB BLD-MCNC: 15.8 G/DL (ref 13.6–17.2)
IMM GRANULOCYTES # BLD AUTO: 0 K/UL (ref 0–0.5)
IMM GRANULOCYTES NFR BLD AUTO: 0 % (ref 0–5)
LYMPHOCYTES # BLD: 2.1 K/UL (ref 0.5–4.6)
LYMPHOCYTES NFR BLD: 47 % (ref 13–44)
MCH RBC QN AUTO: 30.9 PG (ref 26.1–32.9)
MCHC RBC AUTO-ENTMCNC: 32.8 G/DL (ref 31.4–35)
MCV RBC AUTO: 93.9 FL (ref 79.6–97.8)
MONOCYTES # BLD: 0.3 K/UL (ref 0.1–1.3)
MONOCYTES NFR BLD: 7 % (ref 4–12)
NEUTS SEG # BLD: 1.9 K/UL (ref 1.7–8.2)
NEUTS SEG NFR BLD: 41 % (ref 43–78)
NRBC # BLD: 0 K/UL (ref 0–0.2)
PLATELET # BLD AUTO: 201 K/UL (ref 150–450)
PMV BLD AUTO: 11.9 FL (ref 9.4–12.3)
POTASSIUM SERPL-SCNC: 4.9 MMOL/L (ref 3.5–5.1)
PROT SERPL-MCNC: 8.2 G/DL (ref 6.3–8.2)
RBC # BLD AUTO: 5.12 M/UL (ref 4.23–5.6)
SODIUM SERPL-SCNC: 138 MMOL/L (ref 136–145)
TROPONIN I SERPL-MCNC: <0.02 NG/ML (ref 0.02–0.05)
WBC # BLD AUTO: 4.5 K/UL (ref 4.3–11.1)

## 2019-08-05 PROCEDURE — 93005 ELECTROCARDIOGRAM TRACING: CPT | Performed by: EMERGENCY MEDICINE

## 2019-08-05 PROCEDURE — 85025 COMPLETE CBC W/AUTO DIFF WBC: CPT

## 2019-08-05 PROCEDURE — 80053 COMPREHEN METABOLIC PANEL: CPT

## 2019-08-05 PROCEDURE — 99285 EMERGENCY DEPT VISIT HI MDM: CPT | Performed by: EMERGENCY MEDICINE

## 2019-08-05 PROCEDURE — 70450 CT HEAD/BRAIN W/O DYE: CPT

## 2019-08-05 PROCEDURE — 84484 ASSAY OF TROPONIN QUANT: CPT

## 2019-08-05 PROCEDURE — 71046 X-RAY EXAM CHEST 2 VIEWS: CPT

## 2019-08-05 NOTE — PROGRESS NOTES
Visit with patient in ER. Calm.     Durga Wallace, staff   C: [phone] /  [email or web address]

## 2019-08-05 NOTE — DISCHARGE INSTRUCTIONS
Patient Education        Numbness and Tingling: Care Instructions  Your Care Instructions    Many things can cause numbness or tingling. Swelling may put pressure on a nerve. This could cause you to lose feeling or have a pins-and-needles sensation on part of your body. Nerves may be damaged from trauma, toxins, or diseases, such as diabetes or multiple sclerosis (MS). Sometimes, though, the cause is not clear. If there is no clear reason for your symptoms, and you are not having any other symptoms, your doctor may suggest watching and waiting for a while to see if the numbness or tingling goes away on its own. Your doctor may want you to have blood or nerve tests to find the cause of your symptoms. Follow-up care is a key part of your treatment and safety. Be sure to make and go to all appointments, and call your doctor if you are having problems. It's also a good idea to know your test results and keep a list of the medicines you take. How can you care for yourself at home? · If your doctor prescribes medicine, take it exactly as directed. Call your doctor if you think you are having a problem with your medicine. · If you have any swelling, put ice or a cold pack on the area for 10 to 20 minutes at a time. Put a thin cloth between the ice and your skin. When should you call for help? Call 911 anytime you think you may need emergency care. For example, call if:    · You have weakness, numbness, or tingling in both legs.     · You lose bowel or bladder control.     · You have symptoms of a stroke. These may include:  ? Sudden numbness, tingling, weakness, or loss of movement in your face, arm, or leg, especially on only one side of your body. ? Sudden vision changes. ? Sudden trouble speaking. ? Sudden confusion or trouble understanding simple statements. ? Sudden problems with walking or balance.   ? A sudden, severe headache that is different from past headaches.    Watch closely for changes in your health, and be sure to contact your doctor if you have any problems, or if:    · You do not get better as expected. Where can you learn more? Go to http://israel-kathleen.info/. Enter K754 in the search box to learn more about \"Numbness and Tingling: Care Instructions. \"  Current as of: March 28, 2019  Content Version: 12.1  © 2221-4283 [employer]. Care instructions adapted under license by [employer] (which disclaims liability or warranty for this information). If you have questions about a medical condition or this instruction, always ask your healthcare professional. [address] any warranty or liability for your use of this information.

## 2019-08-05 NOTE — ED TRIAGE NOTES
Pt reports right numbness for 3 days. Pt states this has been accompanied by some chest pressure as well. No weakness noted or reported. Denies problems with right leg or speech. Pt also reports when he blinks a lot his eyes get blurry. Pt states right lower quad abd cramping as well.

## 2019-08-05 NOTE — ED PROVIDER NOTES
The history is provided by the patient. Numbness   This is a new problem. The current episode started more than 2 days ago. The problem has not changed since onset. There was right upper extremity and right lower extremity focality noted. Pertinent negatives include no focal weakness, no loss of sensation, no loss of balance, no slurred speech, no speech difficulty, no memory loss, no movement disorder, no agitation, no visual change (Mild visual blurring when his eyes started watering when he blinks too much), no auditory change, no mental status change, no unresponsiveness and no disorientation. There has been no fever. Associated symptoms include chest pain (Off and on for the past 3 to 4 days, sharp in nature only lasting a minute or 2. Not associated with activity. ). Pertinent negatives include no shortness of breath, no vomiting, no altered mental status, no confusion, no headaches, no choking, no nausea, no bowel incontinence and no bladder incontinence. There were no medications administered prior to arrival. Associated medical issues do not include trauma, mood changes, bleeding disorder, seizures, dementia, CVA or clotting disorder. Past Medical History:   Diagnosis Date    Cancer [facility])     colon cancer    Gastrointestinal disorder     GERD    Hypertension     Ill-defined condition     blood clot neck       Past Surgical History:   Procedure Laterality Date    ABDOMEN SURGERY PROC UNLISTED      colon ca    HX ORTHOPAEDIC      back]         History reviewed. No pertinent family history.     Social History     Socioeconomic History    Marital status: LEGALLY      Spouse name: Not on file    Number of children: Not on file    Years of education: Not on file    Highest education level: Not on file   Occupational History    Not on file   Social Needs    Financial resource strain: Not on file    Food insecurity:     Worry: Not on file     Inability: Not on file   [place] needs: Medical: Not on file     Non-medical: Not on file   Tobacco Use    Smoking status: Never Smoker   Substance and Sexual Activity    Alcohol use: No    Drug use: No    Sexual activity: Not on file   Lifestyle    Physical activity:     Days per week: Not on file     Minutes per session: Not on file    Stress: Not on file   Relationships    Social connections:     Talks on phone: Not on file     Gets together: Not on file     Attends Jewish service: Not on file     Active member of club or organization: Not on file     Attends meetings of clubs or organizations: Not on file     Relationship status: Not on file    Intimate partner violence:     Fear of current or ex partner: Not on file     Emotionally abused: Not on file     Physically abused: Not on file     Forced sexual activity: Not on file   Other Topics Concern    Not on file   Social History Narrative    Not on file         ALLERGIES: Patient has no known allergies. Review of Systems   Constitutional: Positive for fatigue. Negative for chills and fever. Respiratory: Negative for choking and shortness of breath. Cardiovascular: Positive for chest pain (Off and on for the past 3 to 4 days, sharp in nature only lasting a minute or 2. Not associated with activity. ). Negative for palpitations and leg swelling. Gastrointestinal: Negative for bowel incontinence, nausea and vomiting. Genitourinary: Negative for bladder incontinence, dysuria and frequency. Musculoskeletal: Negative for neck pain and neck stiffness. Neurological: Positive for numbness. Negative for focal weakness, speech difficulty, headaches and loss of balance. Psychiatric/Behavioral: Negative for agitation, confusion and memory loss. All other systems reviewed and are negative.       Vitals:    08/05/19 1151 08/05/19 1207 08/05/19 1247   BP: 169/86 168/90 144/79   Pulse: 86     Resp: 16     Temp: 97.9 °F (36.6 °C)     SpO2: 98% 98% 94%   Weight: 111.1 kg (245 lb)     Height: 5' 10\" (1.778 m)              Physical Exam   Constitutional: He is oriented to person, place, and time. He appears well-developed and well-nourished. No distress. HENT:   Head: Normocephalic and atraumatic. Right Ear: External ear normal.   Left Ear: External ear normal.   Mouth/Throat: Oropharynx is clear and moist.   Eyes: Pupils are equal, round, and reactive to light. Conjunctivae and EOM are normal.   Neck: Normal range of motion. Neck supple. Cardiovascular: Normal rate, regular rhythm, normal heart sounds and intact distal pulses. Pulmonary/Chest: Effort normal and breath sounds normal.   Abdominal: Soft. Bowel sounds are normal. There is no tenderness. Musculoskeletal: Normal range of motion. He exhibits no edema. Neurological: He is alert and oriented to person, place, and time. He has normal strength. A sensory deficit (Subjective altered sensation to right upper extremity and right proximal lower extremity compared to the opposite side.) is present. No cranial nerve deficit. He displays a negative Romberg sign. Coordination normal.   Skin: Skin is warm and dry. Capillary refill takes less than 2 seconds. Psychiatric: He has a normal mood and affect. His speech is normal and behavior is normal. Cognition and memory are normal.   Nursing note and vitals reviewed.        MDM  Number of Diagnoses or Management Options  Numbness and tingling: new and requires workup     Amount and/or Complexity of Data Reviewed  Clinical lab tests: ordered and reviewed  Tests in the radiology section of CPT®: ordered and reviewed  Review and summarize past medical records: yes  Independent visualization of images, tracings, or specimens: yes    Risk of Complications, Morbidity, and/or Mortality  Presenting problems: high  Diagnostic procedures: moderate  Management options: moderate    Patient Progress  Patient progress: stable         Procedures

## 2019-08-05 NOTE — ED NOTES
I have reviewed discharge instructions with the patient. The patient verbalized understanding. Patient left ED via Discharge Method: ambulatory to Home with self). Opportunity for questions and clarification provided. Patient given 0 scripts. No e-sign        To continue your aftercare when you leave the hospital, you may receive an automated call from our care team to check in on how you are doing. This is a free service and part of our promise to provide the best care and service to meet your aftercare needs.  If you have questions, or wish to unsubscribe from this service please call [phone]. Thank you for Choosing our [facility] Emergency Department.

## 2019-08-06 LAB
ATRIAL RATE: 89 BPM
CALCULATED P AXIS, ECG09: 74 DEGREES
CALCULATED R AXIS, ECG10: 91 DEGREES
CALCULATED T AXIS, ECG11: 26 DEGREES
DIAGNOSIS, 93000: NORMAL
P-R INTERVAL, ECG05: 162 MS
Q-T INTERVAL, ECG07: 360 MS
QRS DURATION, ECG06: 76 MS
QTC CALCULATION (BEZET), ECG08: 438 MS
VENTRICULAR RATE, ECG03: 89 BPM

## 2022-03-19 PROBLEM — K62.5 RECTAL BLEED: Status: ACTIVE | Noted: 2018-12-01

## 2022-11-19 ENCOUNTER — HOSPITAL ENCOUNTER (EMERGENCY)
Age: 62
Discharge: HOME | End: 2022-11-20
Attending: EMERGENCY MEDICINE

## 2022-11-19 DIAGNOSIS — T78.3XXA ACE INHIBITOR-AGGRAVATED ANGIOEDEMA, INITIAL ENCOUNTER: Primary | ICD-10-CM

## 2022-11-19 DIAGNOSIS — T46.4X5A ACE INHIBITOR-AGGRAVATED ANGIOEDEMA, INITIAL ENCOUNTER: Primary | ICD-10-CM

## 2022-11-19 PROCEDURE — 96375 TX/PRO/DX INJ NEW DRUG ADDON: CPT

## 2022-11-19 PROCEDURE — 99284 EMERGENCY DEPT VISIT MOD MDM: CPT

## 2022-11-19 PROCEDURE — 96374 THER/PROPH/DIAG INJ IV PUSH: CPT

## 2022-11-19 RX ORDER — DIPHENHYDRAMINE HYDROCHLORIDE 50 MG/ML
25 INJECTION INTRAMUSCULAR; INTRAVENOUS
Status: COMPLETED | OUTPATIENT
Start: 2022-11-20 | End: 2022-11-20

## 2022-11-19 RX ORDER — DEXAMETHASONE SODIUM PHOSPHATE 10 MG/ML
10 INJECTION INTRAMUSCULAR; INTRAVENOUS
Status: COMPLETED | OUTPATIENT
Start: 2022-11-20 | End: 2022-11-20

## 2022-11-19 ASSESSMENT — PAIN - FUNCTIONAL ASSESSMENT: PAIN_FUNCTIONAL_ASSESSMENT: 0-10

## 2022-11-19 ASSESSMENT — PAIN SCALES - GENERAL: PAINLEVEL_OUTOF10: 0

## 2022-11-20 VITALS
BODY MASS INDEX: 39.99 KG/M2 | DIASTOLIC BLOOD PRESSURE: 109 MMHG | OXYGEN SATURATION: 95 % | TEMPERATURE: 97.7 F | SYSTOLIC BLOOD PRESSURE: 128 MMHG | HEART RATE: 72 BPM | RESPIRATION RATE: 16 BRPM | HEIGHT: 69 IN | WEIGHT: 270 LBS

## 2022-11-20 PROCEDURE — 6360000002 HC RX W HCPCS: Performed by: EMERGENCY MEDICINE

## 2022-11-20 PROCEDURE — 2500000003 HC RX 250 WO HCPCS: Performed by: EMERGENCY MEDICINE

## 2022-11-20 PROCEDURE — 2580000003 HC RX 258: Performed by: EMERGENCY MEDICINE

## 2022-11-20 PROCEDURE — 96375 TX/PRO/DX INJ NEW DRUG ADDON: CPT

## 2022-11-20 PROCEDURE — A4216 STERILE WATER/SALINE, 10 ML: HCPCS | Performed by: EMERGENCY MEDICINE

## 2022-11-20 PROCEDURE — 96374 THER/PROPH/DIAG INJ IV PUSH: CPT

## 2022-11-20 RX ORDER — FAMOTIDINE 20 MG/1
20 TABLET, FILM COATED ORAL 2 TIMES DAILY
Qty: 20 TABLET | Refills: 0 | Status: SHIPPED | OUTPATIENT
Start: 2022-11-20

## 2022-11-20 RX ORDER — AMLODIPINE BESYLATE 5 MG/1
5 TABLET ORAL DAILY
Qty: 30 TABLET | Refills: 0 | Status: SHIPPED | OUTPATIENT
Start: 2022-11-20

## 2022-11-20 RX ORDER — METHYLPREDNISOLONE 4 MG/1
TABLET ORAL
Qty: 1 KIT | Refills: 0 | Status: SHIPPED | OUTPATIENT
Start: 2022-11-20

## 2022-11-20 RX ORDER — HYDROXYZINE PAMOATE 25 MG/1
25 CAPSULE ORAL 4 TIMES DAILY PRN
Qty: 20 CAPSULE | Refills: 0 | Status: SHIPPED | OUTPATIENT
Start: 2022-11-20 | End: 2022-11-27

## 2022-11-20 RX ADMIN — DIPHENHYDRAMINE HYDROCHLORIDE 25 MG: 50 INJECTION, SOLUTION INTRAMUSCULAR; INTRAVENOUS at 02:08

## 2022-11-20 RX ADMIN — FAMOTIDINE 20 MG: 10 INJECTION, SOLUTION INTRAVENOUS at 02:08

## 2022-11-20 RX ADMIN — DEXAMETHASONE SODIUM PHOSPHATE 10 MG: 10 INJECTION INTRAMUSCULAR; INTRAVENOUS at 02:08

## 2022-11-20 ASSESSMENT — ENCOUNTER SYMPTOMS
VOMITING: 0
EYE DISCHARGE: 0
CHEST TIGHTNESS: 0
ALLERGIC REACTION: 1
EYE ITCHING: 0
SHORTNESS OF BREATH: 0
COUGH: 0
NAUSEA: 0
DIARRHEA: 0

## 2022-11-20 ASSESSMENT — PAIN - FUNCTIONAL ASSESSMENT: PAIN_FUNCTIONAL_ASSESSMENT: NONE - DENIES PAIN

## 2022-11-20 NOTE — ED PROVIDER NOTES
Emergency Department Provider Note                   PCP:                EUGENIO Pro               Age: 65 y.o. Sex: male       ICD-10-CM    1. ACE inhibitor-aggravated angioedema, initial encounter  T78. 3XXA     T46.4X5A           DISPOSITION Decision To Discharge 11/20/2022 01:31:39 AM       MDM  Number of Diagnoses or Management Options  ACE inhibitor-aggravated angioedema, initial encounter: new, needed workup  Diagnosis management comments: 29-year-old male patient with swelling in the lips consistent with a seduced angioedema    Will monitor  Will give steroids, Pepcid and Benadryl    Patient counseled extensively about potential progression of an ACE induced angioedema to the point of airway compromise/airway obstruction  He voiced understanding of the need to avoid any ACE inhibitor medications in the future       Amount and/or Complexity of Data Reviewed  Clinical lab tests: reviewed  Tests in the medicine section of CPT®: ordered and reviewed  Decide to obtain previous medical records or to obtain history from someone other than the patient: yes  Review and summarize past medical records: yes    Risk of Complications, Morbidity, and/or Mortality  Presenting problems: moderate  Diagnostic procedures: low  Management options: moderate  General comments: Elements of this note have been dictated via voice recognition software. Text and phrases may be limited by the accuracy of the software. The chart has been reviewed, but errors may still be present. Patient Progress  Patient progress: stable             No orders of the defined types were placed in this encounter.        Medications   dexamethasone (DECADRON) injection 10 mg (has no administration in time range)   diphenhydrAMINE (BENADRYL) injection 25 mg (has no administration in time range)   famotidine (PEPCID) 20 mg in sodium chloride (PF) 0.9 % 10 mL injection (has no administration in time range)       New Prescriptions AMLODIPINE (NORVASC) 5 MG TABLET    Take 1 tablet by mouth daily    FAMOTIDINE (PEPCID) 20 MG TABLET    Take 1 tablet by mouth 2 times daily    HYDROXYZINE PAMOATE (VISTARIL) 25 MG CAPSULE    Take 1 capsule by mouth 4 times daily as needed for Itching    METHYLPREDNISOLONE (MEDROL DOSEPACK) 4 MG TABLET    Take by mouth as directed on pack        Luis Adames is a 65 y.o. male who presents to the Emergency Department with chief complaint of    Chief Complaint   Patient presents with    Angioedema      28-year-old male patient presents to the ER with complaints of swelling in his lips since about 6:30 PM  Is afraid that he may have eaten something unusual  When questioned in triage she does report that he takes lisinopril  Has been on the lisinopril for many years without issue  He does not feel that his tongue or back disorder swollen  He denies any shortness of breath  No fevers or chills    The history is provided by the patient. Allergic Reaction  Presenting symptoms: swelling    Presenting symptoms: no rash    Severity:  Moderate  Duration:  5 hours  Prior allergic episodes:  No prior episodes  Context: medications    Relieved by:  None tried  Worsened by:  Nothing  Ineffective treatments:  None tried    All other systems reviewed and are negative unless otherwise stated in the history of present illness section. Review of Systems   Constitutional:  Negative for chills and fever. HENT:  Negative for hearing loss, sneezing and tinnitus. Eyes:  Negative for discharge and itching. Respiratory:  Negative for cough, chest tightness and shortness of breath. Cardiovascular:  Negative for chest pain and palpitations. Gastrointestinal:  Negative for diarrhea, nausea and vomiting. Endocrine: Negative for cold intolerance, heat intolerance, polydipsia, polyphagia and polyuria. Genitourinary:  Negative for dysuria, hematuria and urgency. Musculoskeletal:  Positive for arthralgias.  Negative for myalgias. Skin:  Negative for rash and wound. Allergic/Immunologic: Negative for immunocompromised state. Neurological:  Negative for seizures, syncope and headaches. Hematological: Negative. Psychiatric/Behavioral:  Negative for dysphoric mood and self-injury. The patient is not nervous/anxious. All other systems reviewed and are negative. Past Medical History:   Diagnosis Date    Cancer [facility])     colon cancer    Gastrointestinal disorder     GERD    Hypertension     Ill-defined condition     blood clot neck        Past Surgical History:   Procedure Laterality Date    ORTHOPEDIC SURGERY      back]    AL ABDOMEN SURGERY PROC UNLISTED      colon ca        No family history on file. Social History     Socioeconomic History    Marital status: Legally    Tobacco Use    Smoking status: Never   Substance and Sexual Activity    Alcohol use: No    Drug use: No        Allergies: Ibuprofen    Previous Medications    No medications on file        Vitals signs and nursing note reviewed. Patient Vitals for the past 4 hrs:   Temp Pulse Resp BP SpO2   11/19/22 2216 97.7 °F (36.5 °C) 76 20 134/83 97 %          Physical Exam  Vitals and nursing note reviewed. Constitutional:       General: He is in acute distress. Appearance: Normal appearance. He is normal weight. HENT:      Head: Normocephalic and atraumatic. Right Ear: External ear normal.      Left Ear: External ear normal.      Nose: Nose normal.      Mouth/Throat:      Mouth: Mucous membranes are moist.      Pharynx: Oropharynx is clear. Eyes:      General: No scleral icterus. Extraocular Movements: Extraocular movements intact. Conjunctiva/sclera: Conjunctivae normal.      Pupils: Pupils are equal, round, and reactive to light. Cardiovascular:      Rate and Rhythm: Normal rate and regular rhythm. Pulses: Normal pulses. Heart sounds: Normal heart sounds.    Pulmonary:      Effort: Pulmonary effort is normal. No respiratory distress. Breath sounds: Normal breath sounds. Abdominal:      General: Abdomen is flat. Bowel sounds are normal. There is no distension. Palpations: Abdomen is soft. There is no mass. Tenderness: There is no abdominal tenderness. Musculoskeletal:         General: No deformity or signs of injury. Normal range of motion. Cervical back: Normal range of motion and neck supple. Skin:     General: Skin is warm and dry. Capillary Refill: Capillary refill takes less than 2 seconds. Neurological:      General: No focal deficit present. Mental Status: He is alert and oriented to person, place, and time. Psychiatric:         Mood and Affect: Mood normal.         Behavior: Behavior normal.         Thought Content: Thought content normal.         Judgment: Judgment normal.        Procedures    ED EKG Interpretation  EKG was interpreted in the absence of a cardiologist.      No results found for any visits on 11/19/22. No orders to display                         Voice dictation software was used during the making of this note. This software is not perfect and grammatical and other typographical errors may be present. This note has not been completely proofread for errors.      Pranav Vance MD  11/20/22 6920

## 2022-11-20 NOTE — ED NOTES
I have reviewed discharge instructions with the patient. The patient verbalized understanding. Patient left ED via Discharge Method: ambulatory to Home with self. Opportunity for questions and clarification provided. Patient given 4 scripts. To continue your aftercare when you leave the hospital, you may receive an automated call from our care team to check in on how you are doing. This is a free service and part of our promise to provide the best care and service to meet your aftercare needs.  If you have questions, or wish to unsubscribe from this service please call [phone]. Thank you for Choosing our [facility] Emergency Department.           Girtha Bence, RN  11/20/22 7455

## 2022-11-20 NOTE — DISCHARGE INSTRUCTIONS
It is very important that you avoid all medications that are ACE inhibitor's. Most of the names and in[place]. Taking these medications could be life-threatening.   Take prescription medications as written  Call your doctor Monday for follow-up    If you notice any worsening swelling swelling in the tongue or throat return immediately to the ER for reevaluation

## 2022-11-20 NOTE — ED TRIAGE NOTES
Pt ambulatory to triage with cane. Pt states he feels as though he ate something. Pt takes lisinopril. Swollen lips. Able to talk in clear sentences. No tongue swelling noted at this time. Unlabored breathing. Alert and oriented. NAD. No respiratory distress noted at this time.

## 2023-01-30 ENCOUNTER — OFFICE VISIT (OUTPATIENT)
Dept: FAMILY MEDICINE CLINIC | Facility: CLINIC | Age: 63
End: 2023-01-30
Payer: COMMERCIAL

## 2023-01-30 VITALS
HEIGHT: 70 IN | OXYGEN SATURATION: 96 % | SYSTOLIC BLOOD PRESSURE: 118 MMHG | BODY MASS INDEX: 42.03 KG/M2 | WEIGHT: 293.6 LBS | HEART RATE: 78 BPM | DIASTOLIC BLOOD PRESSURE: 70 MMHG

## 2023-01-30 DIAGNOSIS — G47.33 OBSTRUCTIVE SLEEP APNEA SYNDROME: Primary | ICD-10-CM

## 2023-01-30 DIAGNOSIS — I10 ESSENTIAL HYPERTENSION: ICD-10-CM

## 2023-01-30 DIAGNOSIS — Z86.718 HISTORY OF RECURRENT DEEP VEIN THROMBOSIS (DVT): ICD-10-CM

## 2023-01-30 DIAGNOSIS — R60.0 LOWER EXTREMITY EDEMA: ICD-10-CM

## 2023-01-30 DIAGNOSIS — Z85.038 HISTORY OF COLON CANCER: ICD-10-CM

## 2023-01-30 PROBLEM — N40.1 LOWER URINARY TRACT SYMPTOMS DUE TO BENIGN PROSTATIC HYPERPLASIA: Status: ACTIVE | Noted: 2022-09-19

## 2023-01-30 PROBLEM — R31.29 MICROSCOPIC HEMATURIA: Status: ACTIVE | Noted: 2022-09-19

## 2023-01-30 PROBLEM — M10.9 GOUT: Status: ACTIVE | Noted: 2019-02-06

## 2023-01-30 PROCEDURE — 3078F DIAST BP <80 MM HG: CPT | Performed by: FAMILY MEDICINE

## 2023-01-30 PROCEDURE — 3074F SYST BP LT 130 MM HG: CPT | Performed by: FAMILY MEDICINE

## 2023-01-30 PROCEDURE — 99204 OFFICE O/P NEW MOD 45 MIN: CPT | Performed by: FAMILY MEDICINE

## 2023-01-30 RX ORDER — POTASSIUM CHLORIDE 750 MG/1
10 CAPSULE, EXTENDED RELEASE ORAL DAILY
COMMUNITY
End: 2023-02-01

## 2023-01-30 RX ORDER — FUROSEMIDE 20 MG/1
20 TABLET ORAL 2 TIMES DAILY
COMMUNITY
End: 2023-02-01

## 2023-01-30 RX ORDER — DOCUSATE SODIUM 100 MG/1
100 CAPSULE, LIQUID FILLED ORAL DAILY
COMMUNITY
End: 2023-02-01 | Stop reason: SDUPTHER

## 2023-01-30 RX ORDER — LOSARTAN POTASSIUM 50 MG/1
50 TABLET ORAL DAILY
COMMUNITY
End: 2023-02-01 | Stop reason: SDUPTHER

## 2023-01-30 RX ORDER — METOPROLOL TARTRATE 50 MG/1
50 TABLET, FILM COATED ORAL 2 TIMES DAILY
COMMUNITY
End: 2023-02-01 | Stop reason: SDUPTHER

## 2023-01-30 SDOH — ECONOMIC STABILITY: TRANSPORTATION INSECURITY
IN THE PAST 12 MONTHS, HAS LACK OF TRANSPORTATION KEPT YOU FROM MEETINGS, WORK, OR FROM GETTING THINGS NEEDED FOR DAILY LIVING?: NO

## 2023-01-30 SDOH — ECONOMIC STABILITY: FOOD INSECURITY: WITHIN THE PAST 12 MONTHS, THE FOOD YOU BOUGHT JUST DIDN'T LAST AND YOU DIDN'T HAVE MONEY TO GET MORE.: NEVER TRUE

## 2023-01-30 SDOH — ECONOMIC STABILITY: FOOD INSECURITY: WITHIN THE PAST 12 MONTHS, YOU WORRIED THAT YOUR FOOD WOULD RUN OUT BEFORE YOU GOT MONEY TO BUY MORE.: NEVER TRUE

## 2023-01-30 SDOH — ECONOMIC STABILITY: TRANSPORTATION INSECURITY
IN THE PAST 12 MONTHS, HAS THE LACK OF TRANSPORTATION KEPT YOU FROM MEDICAL APPOINTMENTS OR FROM GETTING MEDICATIONS?: NO

## 2023-01-30 ASSESSMENT — ENCOUNTER SYMPTOMS
CHEST TIGHTNESS: 0
BLOOD IN STOOL: 0
SHORTNESS OF BREATH: 1
CONSTIPATION: 1
COUGH: 1
ABDOMINAL PAIN: 0
WHEEZING: 0

## 2023-01-30 ASSESSMENT — PATIENT HEALTH QUESTIONNAIRE - PHQ9
SUM OF ALL RESPONSES TO PHQ QUESTIONS 1-9: 0
SUM OF ALL RESPONSES TO PHQ QUESTIONS 1-9: 0
1. LITTLE INTEREST OR PLEASURE IN DOING THINGS: 0
SUM OF ALL RESPONSES TO PHQ QUESTIONS 1-9: 0
SUM OF ALL RESPONSES TO PHQ QUESTIONS 1-9: 0
SUM OF ALL RESPONSES TO PHQ9 QUESTIONS 1 & 2: 0
2. FEELING DOWN, DEPRESSED OR HOPELESS: 0

## 2023-01-30 ASSESSMENT — SOCIAL DETERMINANTS OF HEALTH (SDOH): HOW HARD IS IT FOR YOU TO PAY FOR THE VERY BASICS LIKE FOOD, HOUSING, MEDICAL CARE, AND HEATING?: NOT HARD AT ALL

## 2023-01-30 ASSESSMENT — LIFESTYLE VARIABLES
HOW MANY STANDARD DRINKS CONTAINING ALCOHOL DO YOU HAVE ON A TYPICAL DAY: PATIENT DOES NOT DRINK
HOW OFTEN DO YOU HAVE A DRINK CONTAINING ALCOHOL: NEVER

## 2023-01-30 NOTE — PROGRESS NOTES
Trinh Fox is a 65 y.o. male who presents today for the following:  Chief Complaint   Patient presents with    New Patient     Establishing Care; previously seen by [facility]       Allergies   Allergen Reactions    Lisinopril Angioedema     Denies allergy    Ibuprofen Other (See Comments)       Current Outpatient Medications   Medication Sig Dispense Refill    amLODIPine (NORVASC) 5 MG tablet Take 1 tablet by mouth daily 30 tablet 0    methylPREDNISolone (MEDROL DOSEPACK) 4 MG tablet Take by mouth as directed on pack 1 kit 0    famotidine (PEPCID) 20 MG tablet Take 1 tablet by mouth 2 times daily 20 tablet 0     No current facility-administered medications for this visit. Past Medical History:   Diagnosis Date    Cancer ([address]     colon cancer    Gastrointestinal disorder     GERD    Hypertension     Ill-defined condition     blood clot neck       Past Surgical History:   Procedure Laterality Date    ORTHOPEDIC SURGERY      back]    ND UNLISTED PROCEDURE ABDOMEN PERITONEUM & OMENTUM      colon ca       Social History     Tobacco Use    Smoking status: Never     Passive exposure: Never    Smokeless tobacco: Never   Substance Use Topics    Alcohol use: No        No family history on file. Patient is here today for initial visit. He currently receives majority of primary care at the [place]. He has a past medical history of colon cancer around 2012, hypertension, obesity, chronic back pain, recurrent DVTs. Patient reports acute concerns today regarding increased shortness of breath and chest pain. He was seen at the [place] regarding this last week. He was told that he had a problem with his lungs. He had COVID back in 0199 that was complicated by pneumonia. He was given last week furosemide and potassium to take. He reports edema is better but not fully resolved. Chest pain has resolved.   He does continue to feel short of breath  He reports that edema resolves at night when sleeping and then returns during the day. He is scheduled for an echocardiogram at the end of February. He also has a history of sleep apnea but does not currently have a CPAP machine. He was told by his insurance company that he needs a new sleep study. He is unsure when his last colonoscopy was performed but believes it was around 7 years ago. He is currently homeless and living at a [facility]. Reports little control over what he has to eat during the day. Most of the time meals are takeout from [employer]. Review of Systems   Constitutional:  Negative for fatigue and unexpected weight change. Respiratory:  Positive for cough and shortness of breath. Negative for chest tightness and wheezing. Cardiovascular:  Positive for chest pain and leg swelling. Negative for palpitations. Gastrointestinal:  Positive for constipation. Negative for abdominal pain and blood in stool. Genitourinary:  Negative for difficulty urinating. Skin:  Negative for rash. Neurological:  Negative for dizziness and light-headedness. /70   Pulse 78   Ht 5' 10\" (1.778 m)   Wt 293 lb 9.6 oz (133.2 kg)   SpO2 96%   BMI 42.13 kg/m²     Physical Exam  Vitals reviewed. Constitutional:       General: He is not in acute distress. Appearance: Normal appearance. He is obese. He is not ill-appearing. Eyes:      General: No scleral icterus. Conjunctiva/sclera: Conjunctivae normal.   Cardiovascular:      Rate and Rhythm: Normal rate and regular rhythm. Heart sounds: Normal heart sounds. No murmur heard. Pulmonary:      Effort: Pulmonary effort is normal.      Breath sounds: Normal breath sounds. Abdominal:      Palpations: Abdomen is soft. Musculoskeletal:      Right lower leg: Edema present. Left lower leg: Edema present. Neurological:      Mental Status: He is oriented to person, place, and time.    Psychiatric:         Mood and Affect: Mood normal.         Behavior: Behavior normal.        1. Obstructive sleep apnea syndrome  Needs new CPAP. Unsure when last sleep study was performed. -     [facility]  2. Essential hypertension  Assessment & Plan:  Blood pressure is at goal for age. Encouraged continued medication compliance, DASH or Mediterranean diet and daily physical activity. 3. History of colon cancer  Assessment & Plan:  Around 2012. Treated with surgery. Referral to GI for consideration for repeat colonoscopy. Orders:  -     [facility]  4. History of recurrent deep vein thrombosis (DVT)  Assessment & Plan:   Patient reports history of four DVTs. Recommend continued anticoagulation. 5. Lower extremity edema   This is likely multifactorial.  Patient currently has a poor diet of mostly fast food. Encouraged him to ask for a low-sodium option. Agree with checking echocardiogram.  Patient is already scheduled for ECHO at the end of February. Edema on exam today is minimal.  He has follow-up with PCP at the [place] at the end of the week. Patient signed a release to get records from the [place] to review. Patient informed, we will call with blood work results within one week. If you have not heard regarding results in over a week, please contact office. You can also review results on [employer].            Bebeto Ware MD

## 2023-02-01 ENCOUNTER — PATIENT MESSAGE (OUTPATIENT)
Dept: FAMILY MEDICINE CLINIC | Facility: CLINIC | Age: 63
End: 2023-02-01

## 2023-02-01 DIAGNOSIS — Z86.718 HISTORY OF RECURRENT DEEP VEIN THROMBOSIS (DVT): ICD-10-CM

## 2023-02-01 DIAGNOSIS — N40.1 LOWER URINARY TRACT SYMPTOMS DUE TO BENIGN PROSTATIC HYPERPLASIA: ICD-10-CM

## 2023-02-01 DIAGNOSIS — K59.09 CHRONIC CONSTIPATION: ICD-10-CM

## 2023-02-01 DIAGNOSIS — M1A.9XX0 CHRONIC GOUT WITHOUT TOPHUS, UNSPECIFIED CAUSE, UNSPECIFIED SITE: ICD-10-CM

## 2023-02-01 DIAGNOSIS — E55.9 VITAMIN D DEFICIENCY: ICD-10-CM

## 2023-02-01 DIAGNOSIS — I10 ESSENTIAL HYPERTENSION: Primary | ICD-10-CM

## 2023-02-01 RX ORDER — ALLOPURINOL 100 MG/1
100 TABLET ORAL DAILY
Qty: 90 TABLET | Refills: 1 | Status: SHIPPED | OUTPATIENT
Start: 2023-02-01

## 2023-02-01 RX ORDER — ALLOPURINOL 100 MG/1
100 TABLET ORAL DAILY
COMMUNITY
End: 2023-02-01 | Stop reason: SDUPTHER

## 2023-02-01 RX ORDER — METOPROLOL TARTRATE 50 MG/1
50 TABLET, FILM COATED ORAL 2 TIMES DAILY
Qty: 180 TABLET | Refills: 1 | Status: SHIPPED | OUTPATIENT
Start: 2023-02-01

## 2023-02-01 RX ORDER — DOCUSATE SODIUM 100 MG/1
100 CAPSULE, LIQUID FILLED ORAL DAILY
Qty: 90 CAPSULE | Refills: 1 | Status: SHIPPED | OUTPATIENT
Start: 2023-02-01

## 2023-02-01 RX ORDER — TAMSULOSIN HYDROCHLORIDE 0.4 MG/1
0.4 CAPSULE ORAL DAILY
Qty: 90 CAPSULE | Refills: 1 | Status: SHIPPED | OUTPATIENT
Start: 2023-02-01

## 2023-02-01 RX ORDER — TAMSULOSIN HYDROCHLORIDE 0.4 MG/1
0.4 CAPSULE ORAL DAILY
COMMUNITY
End: 2023-02-01 | Stop reason: SDUPTHER

## 2023-02-01 RX ORDER — LOSARTAN POTASSIUM 50 MG/1
50 TABLET ORAL DAILY
Qty: 90 TABLET | Refills: 1 | Status: SHIPPED | OUTPATIENT
Start: 2023-02-01

## 2023-02-01 NOTE — TELEPHONE ENCOUNTER
From: Keo Horner III  To: Dr. Jessie Bryant: 2023 5:49 AM EST  Subject: My medication     Here are the medicine I take: Metoprolol Tartrate 50 mg twice a day. Losartan 50mg tablet. Multivitamin cap/tab. Vitamin D3 25mcg (1000 IU). Docusate NA 100mg cap. Allopurinol 100 mg. Rivaroxaban 20mg tablet. Tamsulosin Hydrochloride 0.4mg. These are the medicine I take daily.

## 2023-02-14 DIAGNOSIS — I10 ESSENTIAL HYPERTENSION: ICD-10-CM

## 2023-02-14 DIAGNOSIS — E55.9 VITAMIN D DEFICIENCY: ICD-10-CM

## 2023-02-14 DIAGNOSIS — N40.1 LOWER URINARY TRACT SYMPTOMS DUE TO BENIGN PROSTATIC HYPERPLASIA: ICD-10-CM

## 2023-02-14 DIAGNOSIS — Z86.718 HISTORY OF RECURRENT DEEP VEIN THROMBOSIS (DVT): ICD-10-CM

## 2023-02-14 DIAGNOSIS — M1A.9XX0 CHRONIC GOUT WITHOUT TOPHUS, UNSPECIFIED CAUSE, UNSPECIFIED SITE: ICD-10-CM

## 2023-02-14 DIAGNOSIS — K59.09 CHRONIC CONSTIPATION: ICD-10-CM

## 2023-02-14 RX ORDER — TAMSULOSIN HYDROCHLORIDE 0.4 MG/1
0.4 CAPSULE ORAL DAILY
Qty: 90 CAPSULE | Refills: 1 | Status: SHIPPED | OUTPATIENT
Start: 2023-02-14

## 2023-02-14 RX ORDER — METOPROLOL TARTRATE 50 MG/1
50 TABLET, FILM COATED ORAL 2 TIMES DAILY
Qty: 180 TABLET | Refills: 1 | Status: SHIPPED | OUTPATIENT
Start: 2023-02-14

## 2023-02-14 RX ORDER — DOCUSATE SODIUM 100 MG/1
100 CAPSULE, LIQUID FILLED ORAL DAILY
Qty: 90 CAPSULE | Refills: 1 | Status: SHIPPED | OUTPATIENT
Start: 2023-02-14

## 2023-02-14 RX ORDER — ALLOPURINOL 100 MG/1
100 TABLET ORAL DAILY
Qty: 90 TABLET | Refills: 1 | Status: SHIPPED | OUTPATIENT
Start: 2023-02-14

## 2023-02-14 RX ORDER — LOSARTAN POTASSIUM 50 MG/1
50 TABLET ORAL DAILY
Qty: 90 TABLET | Refills: 1 | Status: SHIPPED | OUTPATIENT
Start: 2023-02-14

## 2023-02-14 NOTE — TELEPHONE ENCOUNTER
Pt's medications was sent to wrong pharmacy. I have updated pharmacy and pended medications please send.

## 2023-02-23 ENCOUNTER — HOSPITAL ENCOUNTER (OUTPATIENT)
Dept: SLEEP CENTER | Age: 63
Discharge: HOME | End: 2023-02-26
Payer: COMMERCIAL

## 2023-02-23 PROCEDURE — 95811 POLYSOM 6/>YRS CPAP 4/> PARM: CPT

## 2023-03-06 ENCOUNTER — OFFICE VISIT (OUTPATIENT)
Dept: SLEEP MEDICINE | Age: 63
End: 2023-03-06
Payer: COMMERCIAL

## 2023-03-06 VITALS
BODY MASS INDEX: 43.09 KG/M2 | HEIGHT: 70 IN | OXYGEN SATURATION: 98 % | DIASTOLIC BLOOD PRESSURE: 92 MMHG | RESPIRATION RATE: 14 BRPM | TEMPERATURE: 97 F | WEIGHT: 301 LBS | SYSTOLIC BLOOD PRESSURE: 152 MMHG | HEART RATE: 65 BPM

## 2023-03-06 DIAGNOSIS — G47.10 HYPERSOMNOLENCE: ICD-10-CM

## 2023-03-06 DIAGNOSIS — G47.31 CENTRAL SLEEP APNEA: ICD-10-CM

## 2023-03-06 DIAGNOSIS — G47.33 OSA (OBSTRUCTIVE SLEEP APNEA): ICD-10-CM

## 2023-03-06 DIAGNOSIS — G47.31 COMPLEX SLEEP APNEA SYNDROME: Primary | ICD-10-CM

## 2023-03-06 DIAGNOSIS — G47.34 NOCTURNAL HYPOXEMIA: ICD-10-CM

## 2023-03-06 PROCEDURE — 99203 OFFICE O/P NEW LOW 30 MIN: CPT | Performed by: PHYSICIAN ASSISTANT

## 2023-03-06 PROCEDURE — 3080F DIAST BP >= 90 MM HG: CPT | Performed by: PHYSICIAN ASSISTANT

## 2023-03-06 PROCEDURE — 3077F SYST BP >= 140 MM HG: CPT | Performed by: PHYSICIAN ASSISTANT

## 2023-03-06 ASSESSMENT — SLEEP AND FATIGUE QUESTIONNAIRES
HOW LIKELY ARE YOU TO NOD OFF OR FALL ASLEEP WHILE SITTING AND READING: 3
ESS TOTAL SCORE: 18
HOW LIKELY ARE YOU TO NOD OFF OR FALL ASLEEP WHILE SITTING INACTIVE IN A PUBLIC PLACE: 3
HOW LIKELY ARE YOU TO NOD OFF OR FALL ASLEEP WHILE WATCHING TV: 3
HOW LIKELY ARE YOU TO NOD OFF OR FALL ASLEEP WHILE SITTING AND TALKING TO SOMEONE: 2
HOW LIKELY ARE YOU TO NOD OFF OR FALL ASLEEP WHEN YOU ARE A PASSENGER IN A CAR FOR AN HOUR WITHOUT A BREAK: 0
HOW LIKELY ARE YOU TO NOD OFF OR FALL ASLEEP WHILE SITTING QUIETLY AFTER LUNCH WITHOUT ALCOHOL: 3
HOW LIKELY ARE YOU TO NOD OFF OR FALL ASLEEP IN A CAR, WHILE STOPPED FOR A FEW MINUTES IN TRAFFIC: 2
HOW LIKELY ARE YOU TO NOD OFF OR FALL ASLEEP WHILE LYING DOWN TO REST IN THE AFTERNOON WHEN CIRCUMSTANCES PERMIT: 2
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
## 2023-03-06 NOTE — PATIENT INSTRUCTIONS
The company who will be taking care of your CPAP supplies is:      Pt will be set up tomorrow, March 7th at 3pm for his new CPAP.    Address: [address] [place] [address] [address], [address]  Phone: [phone] Option #1  Fax: [phone]

## 2023-07-12 ENCOUNTER — TELEPHONE (OUTPATIENT)
Dept: SLEEP MEDICINE | Age: 63
End: 2023-07-12

## 2023-08-02 DIAGNOSIS — Z86.718 HISTORY OF RECURRENT DEEP VEIN THROMBOSIS (DVT): ICD-10-CM

## 2023-08-02 DIAGNOSIS — M1A.9XX0 CHRONIC GOUT WITHOUT TOPHUS, UNSPECIFIED CAUSE, UNSPECIFIED SITE: ICD-10-CM

## 2023-08-02 DIAGNOSIS — E55.9 VITAMIN D DEFICIENCY: ICD-10-CM

## 2023-08-02 DIAGNOSIS — K59.09 CHRONIC CONSTIPATION: ICD-10-CM

## 2023-08-02 DIAGNOSIS — N40.1 LOWER URINARY TRACT SYMPTOMS DUE TO BENIGN PROSTATIC HYPERPLASIA: ICD-10-CM

## 2023-08-02 DIAGNOSIS — I10 ESSENTIAL HYPERTENSION: ICD-10-CM

## 2023-08-03 RX ORDER — MELATONIN
Qty: 90 TABLET | Refills: 11 | Status: SHIPPED | OUTPATIENT
Start: 2023-08-03

## 2023-08-03 RX ORDER — ALLOPURINOL 100 MG/1
TABLET ORAL
Qty: 90 TABLET | Refills: 11 | Status: SHIPPED | OUTPATIENT
Start: 2023-08-03

## 2023-08-03 RX ORDER — LOSARTAN POTASSIUM 50 MG/1
TABLET ORAL
Qty: 90 TABLET | Refills: 11 | Status: SHIPPED | OUTPATIENT
Start: 2023-08-03

## 2023-08-03 RX ORDER — METOPROLOL TARTRATE 50 MG/1
TABLET, FILM COATED ORAL
Qty: 180 TABLET | Refills: 11 | Status: SHIPPED | OUTPATIENT
Start: 2023-08-03

## 2023-08-03 RX ORDER — RIVAROXABAN 20 MG/1
TABLET, FILM COATED ORAL
Qty: 90 TABLET | Refills: 11 | Status: SHIPPED | OUTPATIENT
Start: 2023-08-03

## 2023-08-03 RX ORDER — DOCUSATE SODIUM 100 MG/1
CAPSULE, LIQUID FILLED ORAL
Qty: 90 CAPSULE | Refills: 11 | Status: SHIPPED | OUTPATIENT
Start: 2023-08-03

## 2023-08-03 RX ORDER — TAMSULOSIN HYDROCHLORIDE 0.4 MG/1
CAPSULE ORAL
Qty: 90 CAPSULE | Refills: 11 | Status: SHIPPED | OUTPATIENT
Start: 2023-08-03

## 2023-08-23 DIAGNOSIS — J98.2 INTERSTITIAL EMPHYSEMA (HCC): ICD-10-CM

## 2023-08-30 ENCOUNTER — OFFICE VISIT (OUTPATIENT)
Dept: PULMONOLOGY | Age: 63
End: 2023-08-30
Payer: OTHER GOVERNMENT

## 2023-08-30 ENCOUNTER — HOSPITAL ENCOUNTER (OUTPATIENT)
Dept: GENERAL RADIOLOGY | Age: 63
Discharge: HOME | End: 2023-09-02
Payer: OTHER GOVERNMENT

## 2023-08-30 VITALS
SYSTOLIC BLOOD PRESSURE: 120 MMHG | HEIGHT: 70 IN | DIASTOLIC BLOOD PRESSURE: 80 MMHG | WEIGHT: 276 LBS | OXYGEN SATURATION: 97 % | TEMPERATURE: 98 F | BODY MASS INDEX: 39.51 KG/M2 | HEART RATE: 57 BPM | RESPIRATION RATE: 20 BRPM

## 2023-08-30 DIAGNOSIS — R91.8 BILATERAL PULMONARY INFILTRATES: ICD-10-CM

## 2023-08-30 DIAGNOSIS — J98.2 INTERSTITIAL EMPHYSEMA (HCC): ICD-10-CM

## 2023-08-30 DIAGNOSIS — U09.9 POST-COVID SYNDROME: Primary | ICD-10-CM

## 2023-08-30 LAB
EXPIRATORY TIME: NORMAL
FEF 25-75% %PRED-PRE: NORMAL
FEF 25-75% PRED: NORMAL
FEF 25-75%-PRE: NORMAL
FEV1 %PRED-PRE: 96 %
FEV1 PRED: NORMAL
FEV1/FVC %PRED-PRE: NORMAL
FEV1/FVC PRED: NORMAL
FEV1/FVC: 88 %
FEV1: 2.95 L
FVC %PRED-PRE: 85 %
FVC PRED: NORMAL
FVC: 3.36 L
PEF %PRED-PRE: NORMAL
PEF PRED: NORMAL
PEF-PRE: NORMAL

## 2023-08-30 PROCEDURE — 94010 BREATHING CAPACITY TEST: CPT | Performed by: INTERNAL MEDICINE

## 2023-08-30 PROCEDURE — 3074F SYST BP LT 130 MM HG: CPT | Performed by: INTERNAL MEDICINE

## 2023-08-30 PROCEDURE — 71046 X-RAY EXAM CHEST 2 VIEWS: CPT

## 2023-08-30 PROCEDURE — 3079F DIAST BP 80-89 MM HG: CPT | Performed by: INTERNAL MEDICINE

## 2023-08-30 PROCEDURE — 99204 OFFICE O/P NEW MOD 45 MIN: CPT | Performed by: INTERNAL MEDICINE

## 2023-08-30 ASSESSMENT — PULMONARY FUNCTION TESTS
FEV1: 2.95
FEV1/FVC: 88
FVC_PERCENT_PREDICTED_PRE: 85
FVC: 3.36
FEV1_PERCENT_PREDICTED_PRE: 96

## 2024-08-05 DIAGNOSIS — I10 ESSENTIAL HYPERTENSION: ICD-10-CM

## 2024-08-05 DIAGNOSIS — Z86.718 HISTORY OF RECURRENT DEEP VEIN THROMBOSIS (DVT): ICD-10-CM

## 2024-08-05 DIAGNOSIS — N40.1 LOWER URINARY TRACT SYMPTOMS DUE TO BENIGN PROSTATIC HYPERPLASIA: ICD-10-CM

## 2024-08-05 DIAGNOSIS — K59.09 CHRONIC CONSTIPATION: ICD-10-CM

## 2024-08-05 DIAGNOSIS — M1A.9XX0 CHRONIC GOUT WITHOUT TOPHUS, UNSPECIFIED CAUSE, UNSPECIFIED SITE: ICD-10-CM

## 2024-08-05 DIAGNOSIS — E55.9 VITAMIN D DEFICIENCY: ICD-10-CM

## 2024-08-05 RX ORDER — ALLOPURINOL 100 MG/1
TABLET ORAL
Qty: 90 TABLET | Refills: 11 | OUTPATIENT
Start: 2024-08-05

## 2024-08-05 RX ORDER — LOSARTAN POTASSIUM 50 MG/1
TABLET ORAL
Qty: 90 TABLET | Refills: 11 | OUTPATIENT
Start: 2024-08-05

## 2024-08-05 RX ORDER — RIVAROXABAN 20 MG/1
TABLET, FILM COATED ORAL
Qty: 90 TABLET | Refills: 11 | OUTPATIENT
Start: 2024-08-05

## 2024-08-05 RX ORDER — TAMSULOSIN HYDROCHLORIDE 0.4 MG/1
CAPSULE ORAL
Qty: 90 CAPSULE | Refills: 11 | OUTPATIENT
Start: 2024-08-05

## 2024-08-05 RX ORDER — METOPROLOL TARTRATE 50 MG
TABLET ORAL
Qty: 180 TABLET | Refills: 11 | OUTPATIENT
Start: 2024-08-05

## 2024-08-05 RX ORDER — DOCUSATE SODIUM 100 MG/1
CAPSULE, LIQUID FILLED ORAL
Qty: 90 CAPSULE | Refills: 11 | OUTPATIENT
Start: 2024-08-05

## 2024-08-05 RX ORDER — CHOLECALCIFEROL (VITAMIN D3) 25 MCG
TABLET ORAL
Qty: 90 TABLET | Refills: 11 | OUTPATIENT
Start: 2024-08-05

## 2025-03-26 DIAGNOSIS — R91.8 BILATERAL PULMONARY INFILTRATES: Primary | ICD-10-CM

## 2025-06-19 ENCOUNTER — OFFICE VISIT (OUTPATIENT)
Dept: PULMONOLOGY | Age: 65
End: 2025-06-19
Payer: OTHER GOVERNMENT

## 2025-06-19 ENCOUNTER — HOSPITAL ENCOUNTER (OUTPATIENT)
Dept: GENERAL RADIOLOGY | Age: 65
Discharge: HOME | End: 2025-06-21
Payer: OTHER GOVERNMENT

## 2025-06-19 VITALS
TEMPERATURE: 97.5 F | OXYGEN SATURATION: 96 % | DIASTOLIC BLOOD PRESSURE: 78 MMHG | SYSTOLIC BLOOD PRESSURE: 145 MMHG | HEART RATE: 67 BPM | BODY MASS INDEX: 36.14 KG/M2 | WEIGHT: 244 LBS | HEIGHT: 69 IN

## 2025-06-19 DIAGNOSIS — U09.9 POST-COVID SYNDROME: Primary | ICD-10-CM

## 2025-06-19 DIAGNOSIS — E66.9 OBESITY (BMI 35.0-39.9 WITHOUT COMORBIDITY): ICD-10-CM

## 2025-06-19 DIAGNOSIS — R91.8 BILATERAL PULMONARY INFILTRATES: ICD-10-CM

## 2025-06-19 DIAGNOSIS — G47.33 OBSTRUCTIVE SLEEP APNEA SYNDROME: ICD-10-CM

## 2025-06-19 PROCEDURE — 99214 OFFICE O/P EST MOD 30 MIN: CPT | Performed by: INTERNAL MEDICINE

## 2025-06-19 PROCEDURE — 71046 X-RAY EXAM CHEST 2 VIEWS: CPT

## 2025-06-19 PROCEDURE — 3078F DIAST BP <80 MM HG: CPT | Performed by: INTERNAL MEDICINE

## 2025-06-19 PROCEDURE — 3077F SYST BP >= 140 MM HG: CPT | Performed by: INTERNAL MEDICINE

## 2025-06-19 NOTE — PROGRESS NOTES
Name:  Randall Gilman III  YOB: 1960   MRN: [id number]      Office Visit: 6/19/2025        ASSESSMENT AND PLAN:  (Medical Decision Making)    Impression: 64 y.o. male with intermittent dizziness and perhaps some shortness of breath with heavy exertion with pulmonary infiltrates on CT scan 6 months ago and severe COVID in early 2021.      ICD-10-CM    1. Post-COVID syndrome  U09.9       2. Obstructive sleep apnea syndrome  G47.33 Ambulatory referral to Sleep Medicine      3. Obesity (BMI 35.0-39.9 without comorbidity)  E66.9          Assessment & Plan  1. Long COVID: Chronic.  Hospitalized for 10 days and diagnosed with pneumonia. X-ray normal, but PFTs incomplete due to machine issues.  - Re-evaluation of PFTs necessary to assess lung function  - Consider inhalers or other treatments if abnormalities present  - Discussed exercise and conditioning benefits, overall stable to improved over the past 2 years  - Doubtful there will be additional intervention or monitoring needs other than exercise.     2. Sleep apnea: Chronic.  [facility]. Reports issues with water in hose and discomfort, leading to discontinuation.  - Referral to sleep clinic for further evaluation and potential CPAP adjustment  - Discussed Inspire device, a nerve stimulator implanted by ENT doctor, functioning like a pacemaker to stimulate tongue movement during sleep  - Discussed risks, benefits, and alternatives    3. Obesity: contributes to symptom burden and MEHDI     No orders of the defined types were placed in this encounter.  Follow-up and Dispositions    Return in about 3 months (around 9/19/2025) for \[facility]\"" next available, Dr. Bang or HARSHA Nava.       Joy Bang MD    [facility] PATIENT!!   ALL OUTSIDE REFERRALS/TESTS MUST BE AUTHORIZED.     No problem-specific Assessment & Plan notes found for this encounter.      The patient (or guardian, if applicable) and other individuals in attendance with the patient were

## 2025-08-21 ENCOUNTER — OFFICE VISIT (OUTPATIENT)
Dept: FAMILY MEDICINE CLINIC | Facility: CLINIC | Age: 65
End: 2025-08-21

## 2025-08-21 VITALS
OXYGEN SATURATION: 99 % | WEIGHT: 255.8 LBS | DIASTOLIC BLOOD PRESSURE: 80 MMHG | BODY MASS INDEX: 37.89 KG/M2 | SYSTOLIC BLOOD PRESSURE: 130 MMHG | HEIGHT: 69 IN | RESPIRATION RATE: 16 BRPM | TEMPERATURE: 97.5 F | HEART RATE: 70 BPM

## 2025-08-21 DIAGNOSIS — F10.21 CHRONIC ALCOHOLISM IN REMISSION (HCC): ICD-10-CM

## 2025-08-21 DIAGNOSIS — Z86.718 HISTORY OF RECURRENT DEEP VEIN THROMBOSIS (DVT): ICD-10-CM

## 2025-08-21 DIAGNOSIS — Z12.11 ENCOUNTER FOR SCREENING COLONOSCOPY: Primary | ICD-10-CM

## 2025-08-21 DIAGNOSIS — Z11.59 NEED FOR HEPATITIS C SCREENING TEST: ICD-10-CM

## 2025-08-21 DIAGNOSIS — F14.21 COCAINE DEPENDENCE, IN REMISSION (HCC): ICD-10-CM

## 2025-08-21 DIAGNOSIS — M1A.0320 IDIOPATHIC CHRONIC GOUT OF LEFT WRIST WITHOUT TOPHUS: ICD-10-CM

## 2025-08-21 DIAGNOSIS — E66.01 MORBID (SEVERE) OBESITY DUE TO EXCESS CALORIES (HCC): ICD-10-CM

## 2025-08-21 DIAGNOSIS — Z11.4 ENCOUNTER FOR SCREENING FOR HIV: ICD-10-CM

## 2025-08-21 DIAGNOSIS — Z85.038 HISTORY OF COLON CANCER: ICD-10-CM

## 2025-08-21 DIAGNOSIS — I10 ESSENTIAL HYPERTENSION: ICD-10-CM

## 2025-08-21 DIAGNOSIS — N40.1 LOWER URINARY TRACT SYMPTOMS DUE TO BENIGN PROSTATIC HYPERPLASIA: ICD-10-CM

## 2025-08-21 DIAGNOSIS — R73.09 BLOOD GLUCOSE ABNORMAL: ICD-10-CM

## 2025-08-21 DIAGNOSIS — K21.9 GASTROESOPHAGEAL REFLUX DISEASE, UNSPECIFIED WHETHER ESOPHAGITIS PRESENT: ICD-10-CM

## 2025-08-21 DIAGNOSIS — Z12.5 PROSTATE CANCER SCREENING: ICD-10-CM

## 2025-08-21 DIAGNOSIS — G47.33 OBSTRUCTIVE SLEEP APNEA (ADULT) (PEDIATRIC): ICD-10-CM

## 2025-08-21 DIAGNOSIS — Z00.00 MEDICARE ANNUAL WELLNESS VISIT, SUBSEQUENT: Primary | ICD-10-CM

## 2025-08-21 PROBLEM — G89.29 CHRONIC LOW BACK PAIN: Status: ACTIVE | Noted: 2018-02-20

## 2025-08-21 PROBLEM — M54.50 CHRONIC LOW BACK PAIN: Status: ACTIVE | Noted: 2018-02-20

## 2025-08-21 PROBLEM — J98.2 INTERSTITIAL EMPHYSEMA (HCC): Status: RESOLVED | Noted: 2023-08-23 | Resolved: 2025-08-21

## 2025-08-21 PROBLEM — N52.9 ERECTILE DYSFUNCTION: Status: ACTIVE | Noted: 2025-08-21

## 2025-08-21 PROBLEM — E78.5 HYPERLIPIDEMIA: Status: ACTIVE | Noted: 2025-08-21

## 2025-08-21 PROBLEM — M17.9 OSTEOARTHRITIS OF KNEE: Status: ACTIVE | Noted: 2018-02-20

## 2025-08-21 PROBLEM — F33.40 RECURRENT MAJOR DEPRESSIVE DISORDER, IN REMISSION: Status: ACTIVE | Noted: 2025-08-21

## 2025-08-21 PROBLEM — M50.30 DEGENERATION OF CERVICAL INTERVERTEBRAL DISC: Status: ACTIVE | Noted: 2022-02-02

## 2025-08-21 PROBLEM — F51.05 INSOMNIA RELATED TO ANOTHER MENTAL DISORDER: Status: ACTIVE | Noted: 2025-08-21

## 2025-08-21 PROBLEM — F43.12 CHRONIC POST-TRAUMATIC STRESS DISORDER: Status: ACTIVE | Noted: 2025-08-21

## 2025-08-21 PROBLEM — C18.9 CARCINOMA OF COLON (HCC): Status: ACTIVE | Noted: 2025-08-21

## 2025-08-21 PROBLEM — I82.509 CHRONIC DEEP VEIN THROMBOSIS (DVT) OF LOWER EXTREMITY (HCC): Status: ACTIVE | Noted: 2025-08-21

## 2025-08-21 LAB
ALBUMIN SERPL-MCNC: 3.5 G/DL (ref 3.2–4.6)
ALBUMIN/GLOB SERPL: 1 (ref 1–1.9)
ALP SERPL-CCNC: 120 U/L (ref 40–129)
ALT SERPL-CCNC: 19 U/L (ref 8–55)
ANION GAP SERPL CALC-SCNC: 13 MMOL/L (ref 7–16)
AST SERPL-CCNC: 25 U/L (ref 15–37)
BASOPHILS # BLD: 0.06 K/UL (ref 0–0.2)
BASOPHILS NFR BLD: 1.2 % (ref 0–2)
BILIRUB SERPL-MCNC: 0.2 MG/DL (ref 0–1.2)
BILIRUBIN, URINE, POC: NEGATIVE
BLOOD URINE, POC: NEGATIVE
BUN SERPL-MCNC: 22 MG/DL (ref 8–23)
CALCIUM SERPL-MCNC: 9.4 MG/DL (ref 8.8–10.2)
CHLORIDE SERPL-SCNC: 104 MMOL/L (ref 98–107)
CHOLEST SERPL-MCNC: 180 MG/DL (ref 0–200)
CO2 SERPL-SCNC: 20 MMOL/L (ref 20–29)
CREAT SERPL-MCNC: 0.96 MG/DL (ref 0.8–1.3)
DIFFERENTIAL METHOD BLD: ABNORMAL
EOSINOPHIL # BLD: 0.13 K/UL (ref 0–0.8)
EOSINOPHIL NFR BLD: 2.6 % (ref 0.5–7.8)
ERYTHROCYTE [DISTWIDTH] IN BLOOD BY AUTOMATED COUNT: 13.8 % (ref 11.9–14.6)
EST. AVERAGE GLUCOSE BLD GHB EST-MCNC: 116 MG/DL
GLOBULIN SER CALC-MCNC: 3.4 G/DL (ref 2.3–3.5)
GLUCOSE SERPL-MCNC: 92 MG/DL (ref 70–99)
GLUCOSE URINE, POC: NEGATIVE
HBA1C MFR BLD: 5.7 % (ref 0–5.6)
HCT VFR BLD AUTO: 42 % (ref 41.1–50.3)
HDLC SERPL-MCNC: 55 MG/DL (ref 40–60)
HDLC SERPL: 3.3 (ref 0–5)
HGB BLD-MCNC: 13.4 G/DL (ref 13.6–17.2)
HIV 1+2 AB+HIV1 P24 AG SERPL QL IA: NONREACTIVE
HIV 1/2 RESULT COMMENT: NORMAL
IMM GRANULOCYTES # BLD AUTO: 0.01 K/UL (ref 0–0.5)
IMM GRANULOCYTES NFR BLD AUTO: 0.2 % (ref 0–5)
KETONES, URINE, POC: NEGATIVE
LDLC SERPL CALC-MCNC: 108 MG/DL (ref 0–100)
LEUKOCYTE ESTERASE, URINE, POC: NEGATIVE
LYMPHOCYTES # BLD: 1.87 K/UL (ref 0.5–4.6)
LYMPHOCYTES NFR BLD: 37.2 % (ref 13–44)
MCH RBC QN AUTO: 30.9 PG (ref 26.1–32.9)
MCHC RBC AUTO-ENTMCNC: 31.9 G/DL (ref 31.4–35)
MCV RBC AUTO: 96.8 FL (ref 82–102)
MONOCYTES # BLD: 0.35 K/UL (ref 0.1–1.3)
MONOCYTES NFR BLD: 7 % (ref 4–12)
NEUTS SEG # BLD: 2.61 K/UL (ref 1.7–8.2)
NEUTS SEG NFR BLD: 51.8 % (ref 43–78)
NITRITE, URINE, POC: NEGATIVE
NRBC # BLD: 0 K/UL (ref 0–0.2)
PH, URINE, POC: 6 (ref 4.6–8)
PLATELET # BLD AUTO: 166 K/UL (ref 150–450)
PMV BLD AUTO: 12.9 FL (ref 9.4–12.3)
POTASSIUM SERPL-SCNC: 4.3 MMOL/L (ref 3.5–5.1)
PROT SERPL-MCNC: 7 G/DL (ref 6.3–8.2)
PROTEIN,URINE, POC: NEGATIVE
PSA SERPL-MCNC: 0.8 NG/ML (ref 0–4)
RBC # BLD AUTO: 4.34 M/UL (ref 4.23–5.6)
SODIUM SERPL-SCNC: 138 MMOL/L (ref 136–145)
SPECIFIC GRAVITY, URINE, POC: 1.02 (ref 1–1.03)
TRIGL SERPL-MCNC: 83 MG/DL (ref 0–150)
TSH W FREE THYROID IF ABNORMAL: 1.43 UIU/ML (ref 0.27–4.2)
URATE SERPL-MCNC: 7.1 MG/DL (ref 3.9–8.2)
URINALYSIS CLARITY, POC: CLEAR
URINALYSIS COLOR, POC: YELLOW
UROBILINOGEN, POC: NORMAL
VLDLC SERPL CALC-MCNC: 17 MG/DL (ref 6–23)
WBC # BLD AUTO: 5 K/UL (ref 4.3–11.1)

## 2025-08-21 RX ORDER — SILDENAFIL 100 MG/1
50 TABLET, FILM COATED ORAL
COMMUNITY
Start: 2025-04-09

## 2025-08-21 RX ORDER — TAMSULOSIN HYDROCHLORIDE 0.4 MG/1
0.4 CAPSULE ORAL DAILY
Qty: 90 CAPSULE | Refills: 3 | Status: SHIPPED | OUTPATIENT
Start: 2025-08-21

## 2025-08-21 RX ORDER — LOSARTAN POTASSIUM 50 MG/1
50 TABLET ORAL DAILY
Qty: 90 TABLET | Refills: 3 | Status: SHIPPED | OUTPATIENT
Start: 2025-08-21

## 2025-08-21 RX ORDER — OMEPRAZOLE 20 MG/1
20 CAPSULE, DELAYED RELEASE ORAL
Qty: 90 CAPSULE | Refills: 3 | Status: SHIPPED | OUTPATIENT
Start: 2025-08-21

## 2025-08-21 RX ORDER — METOPROLOL TARTRATE 50 MG
50 TABLET ORAL 2 TIMES DAILY
Qty: 180 TABLET | Refills: 3 | Status: SHIPPED | OUTPATIENT
Start: 2025-08-21

## 2025-08-21 RX ORDER — POLYETHYLENE GLYCOL 3350, SODIUM SULFATE ANHYDROUS, SODIUM BICARBONATE, SODIUM CHLORIDE, POTASSIUM CHLORIDE 236; 22.74; 6.74; 5.86; 2.97 G/4L; G/4L; G/4L; G/4L; G/4L
4 POWDER, FOR SOLUTION ORAL ONCE
Qty: 4000 ML | Refills: 0 | Status: SHIPPED | OUTPATIENT
Start: 2025-08-21 | End: 2025-08-21

## 2025-08-21 ASSESSMENT — PATIENT HEALTH QUESTIONNAIRE - PHQ9
3. TROUBLE FALLING OR STAYING ASLEEP: NOT AT ALL
2. FEELING DOWN, DEPRESSED OR HOPELESS: SEVERAL DAYS
6. FEELING BAD ABOUT YOURSELF - OR THAT YOU ARE A FAILURE OR HAVE LET YOURSELF OR YOUR FAMILY DOWN: SEVERAL DAYS
SUM OF ALL RESPONSES TO PHQ QUESTIONS 1-9: 9
10. IF YOU CHECKED OFF ANY PROBLEMS, HOW DIFFICULT HAVE THESE PROBLEMS MADE IT FOR YOU TO DO YOUR WORK, TAKE CARE OF THINGS AT HOME, OR GET ALONG WITH OTHER PEOPLE: NOT DIFFICULT AT ALL
8. MOVING OR SPEAKING SO SLOWLY THAT OTHER PEOPLE COULD HAVE NOTICED. OR THE OPPOSITE, BEING SO FIGETY OR RESTLESS THAT YOU HAVE BEEN MOVING AROUND A LOT MORE THAN USUAL: NEARLY EVERY DAY
7. TROUBLE CONCENTRATING ON THINGS, SUCH AS READING THE NEWSPAPER OR WATCHING TELEVISION: NEARLY EVERY DAY
1. LITTLE INTEREST OR PLEASURE IN DOING THINGS: SEVERAL DAYS
SUM OF ALL RESPONSES TO PHQ QUESTIONS 1-9: 9
4. FEELING TIRED OR HAVING LITTLE ENERGY: NOT AT ALL
SUM OF ALL RESPONSES TO PHQ QUESTIONS 1-9: 9
SUM OF ALL RESPONSES TO PHQ QUESTIONS 1-9: 9
5. POOR APPETITE OR OVEREATING: NOT AT ALL
9. THOUGHTS THAT YOU WOULD BE BETTER OFF DEAD, OR OF HURTING YOURSELF: NOT AT ALL

## 2025-08-21 ASSESSMENT — ENCOUNTER SYMPTOMS
SHORTNESS OF BREATH: 0
CHEST TIGHTNESS: 0
ABDOMINAL PAIN: 0
WHEEZING: 0
CONSTIPATION: 0
DIARRHEA: 0
NAUSEA: 0

## 2025-08-21 ASSESSMENT — LIFESTYLE VARIABLES
HOW OFTEN DO YOU HAVE A DRINK CONTAINING ALCOHOL: NEVER
HOW MANY STANDARD DRINKS CONTAINING ALCOHOL DO YOU HAVE ON A TYPICAL DAY: PATIENT DOES NOT DRINK

## 2025-08-22 ENCOUNTER — RESULTS FOLLOW-UP (OUTPATIENT)
Dept: FAMILY MEDICINE CLINIC | Facility: CLINIC | Age: 65
End: 2025-08-22

## 2025-08-23 LAB
HCV AB SERPL QL IA: NORMAL
HCV IGG SERPL QL IA: NON REACTIVE S/CO RATIO

## 2025-09-02 ENCOUNTER — CLINICAL SUPPORT (OUTPATIENT)
Dept: PULMONOLOGY | Age: 65
End: 2025-09-02
Payer: OTHER GOVERNMENT

## 2025-09-02 DIAGNOSIS — U09.9 POST-COVID SYNDROME: Primary | ICD-10-CM

## 2025-09-02 LAB
FEV 1 , POC: 3.07 L
FEV1 % PRED, POC: 88 %
FEV1/FVC, POC: NORMAL
FVC % PRED, POC: 79 %
FVC, POC: NORMAL

## 2025-09-02 PROCEDURE — 94726 PLETHYSMOGRAPHY LUNG VOLUMES: CPT | Performed by: INTERNAL MEDICINE

## 2025-09-02 PROCEDURE — 94060 EVALUATION OF WHEEZING: CPT | Performed by: INTERNAL MEDICINE

## 2025-09-02 PROCEDURE — 94729 DIFFUSING CAPACITY: CPT | Performed by: INTERNAL MEDICINE

## 2025-09-02 ASSESSMENT — PULMONARY FUNCTION TESTS
FEV1_PERCENT_PREDICTED_POC: 88
FVC_PERCENT_PREDICTED_POC: 79